# Patient Record
Sex: FEMALE | Race: WHITE | NOT HISPANIC OR LATINO | URBAN - METROPOLITAN AREA
[De-identification: names, ages, dates, MRNs, and addresses within clinical notes are randomized per-mention and may not be internally consistent; named-entity substitution may affect disease eponyms.]

---

## 2019-05-15 ENCOUNTER — EMERGENCY (EMERGENCY)
Facility: HOSPITAL | Age: 84
LOS: 0 days | Discharge: HOME | End: 2019-05-15
Attending: EMERGENCY MEDICINE | Admitting: EMERGENCY MEDICINE
Payer: MEDICARE

## 2019-05-15 VITALS
RESPIRATION RATE: 19 BRPM | OXYGEN SATURATION: 98 % | SYSTOLIC BLOOD PRESSURE: 120 MMHG | TEMPERATURE: 98 F | DIASTOLIC BLOOD PRESSURE: 69 MMHG | HEART RATE: 88 BPM

## 2019-05-15 VITALS
RESPIRATION RATE: 20 BRPM | TEMPERATURE: 98 F | DIASTOLIC BLOOD PRESSURE: 62 MMHG | OXYGEN SATURATION: 90 % | SYSTOLIC BLOOD PRESSURE: 127 MMHG | HEART RATE: 91 BPM

## 2019-05-15 DIAGNOSIS — E03.9 HYPOTHYROIDISM, UNSPECIFIED: ICD-10-CM

## 2019-05-15 DIAGNOSIS — Z79.899 OTHER LONG TERM (CURRENT) DRUG THERAPY: ICD-10-CM

## 2019-05-15 DIAGNOSIS — R06.02 SHORTNESS OF BREATH: ICD-10-CM

## 2019-05-15 DIAGNOSIS — I10 ESSENTIAL (PRIMARY) HYPERTENSION: ICD-10-CM

## 2019-05-15 DIAGNOSIS — Z87.891 PERSONAL HISTORY OF NICOTINE DEPENDENCE: ICD-10-CM

## 2019-05-15 DIAGNOSIS — Z79.51 LONG TERM (CURRENT) USE OF INHALED STEROIDS: ICD-10-CM

## 2019-05-15 DIAGNOSIS — J44.1 CHRONIC OBSTRUCTIVE PULMONARY DISEASE WITH (ACUTE) EXACERBATION: ICD-10-CM

## 2019-05-15 LAB
ALBUMIN SERPL ELPH-MCNC: 3.8 G/DL — SIGNIFICANT CHANGE UP (ref 3.5–5.2)
ALP SERPL-CCNC: 80 U/L — SIGNIFICANT CHANGE UP (ref 30–115)
ALT FLD-CCNC: 7 U/L — SIGNIFICANT CHANGE UP (ref 0–41)
ANION GAP SERPL CALC-SCNC: 11 MMOL/L — SIGNIFICANT CHANGE UP (ref 7–14)
AST SERPL-CCNC: 15 U/L — SIGNIFICANT CHANGE UP (ref 0–41)
BASE EXCESS BLDV CALC-SCNC: 4.8 MMOL/L — HIGH (ref -2–2)
BASOPHILS # BLD AUTO: 0 K/UL — SIGNIFICANT CHANGE UP (ref 0–0.2)
BASOPHILS NFR BLD AUTO: 0 % — SIGNIFICANT CHANGE UP (ref 0–1)
BILIRUB SERPL-MCNC: 0.3 MG/DL — SIGNIFICANT CHANGE UP (ref 0.2–1.2)
BUN SERPL-MCNC: 8 MG/DL — LOW (ref 10–20)
CA-I SERPL-SCNC: 1.21 MMOL/L — SIGNIFICANT CHANGE UP (ref 1.12–1.3)
CALCIUM SERPL-MCNC: 9.4 MG/DL — SIGNIFICANT CHANGE UP (ref 8.5–10.1)
CHLORIDE SERPL-SCNC: 98 MMOL/L — SIGNIFICANT CHANGE UP (ref 98–110)
CO2 SERPL-SCNC: 28 MMOL/L — SIGNIFICANT CHANGE UP (ref 17–32)
CREAT SERPL-MCNC: 0.8 MG/DL — SIGNIFICANT CHANGE UP (ref 0.7–1.5)
EOSINOPHIL # BLD AUTO: 6.39 K/UL — HIGH (ref 0–0.7)
EOSINOPHIL NFR BLD AUTO: 50.4 % — HIGH (ref 0–8)
GAS PNL BLDV: 136 MMOL/L — SIGNIFICANT CHANGE UP (ref 136–145)
GAS PNL BLDV: SIGNIFICANT CHANGE UP
GIANT PLATELETS BLD QL SMEAR: PRESENT — SIGNIFICANT CHANGE UP
GLUCOSE SERPL-MCNC: 99 MG/DL — SIGNIFICANT CHANGE UP (ref 70–99)
HCO3 BLDV-SCNC: 33 MMOL/L — HIGH (ref 22–29)
HCT VFR BLD CALC: 36.2 % — LOW (ref 37–47)
HCT VFR BLDA CALC: 61 % — HIGH (ref 34–44)
HGB BLD CALC-MCNC: 19.9 G/DL — HIGH (ref 14–18)
HGB BLD-MCNC: 11.8 G/DL — LOW (ref 12–16)
LACTATE BLDV-MCNC: SIGNIFICANT CHANGE UP MMOL/L (ref 0.5–1.6)
LYMPHOCYTES # BLD AUTO: 0.89 K/UL — LOW (ref 1.2–3.4)
LYMPHOCYTES # BLD AUTO: 7 % — LOW (ref 20.5–51.1)
MAGNESIUM SERPL-MCNC: 2.1 MG/DL — SIGNIFICANT CHANGE UP (ref 1.8–2.4)
MANUAL SMEAR VERIFICATION: SIGNIFICANT CHANGE UP
MCHC RBC-ENTMCNC: 31 PG — SIGNIFICANT CHANGE UP (ref 27–31)
MCHC RBC-ENTMCNC: 32.6 G/DL — SIGNIFICANT CHANGE UP (ref 32–37)
MCV RBC AUTO: 95 FL — SIGNIFICANT CHANGE UP (ref 81–99)
MONOCYTES # BLD AUTO: 0.33 K/UL — SIGNIFICANT CHANGE UP (ref 0.1–0.6)
MONOCYTES NFR BLD AUTO: 2.6 % — SIGNIFICANT CHANGE UP (ref 1.7–9.3)
NEUTROPHILS # BLD AUTO: 4.51 K/UL — SIGNIFICANT CHANGE UP (ref 1.4–6.5)
NEUTROPHILS NFR BLD AUTO: 35.6 % — LOW (ref 42.2–75.2)
PCO2 BLDV: 61 MMHG — HIGH (ref 41–51)
PH BLDV: 7.35 — SIGNIFICANT CHANGE UP (ref 7.26–7.43)
PLAT MORPH BLD: NORMAL — SIGNIFICANT CHANGE UP
PLATELET # BLD AUTO: 270 K/UL — SIGNIFICANT CHANGE UP (ref 130–400)
PO2 BLDV: 28 MMHG — SIGNIFICANT CHANGE UP (ref 20–40)
POTASSIUM BLDV-SCNC: SIGNIFICANT CHANGE UP MMOL/L (ref 3.3–5.6)
POTASSIUM SERPL-MCNC: 4.4 MMOL/L — SIGNIFICANT CHANGE UP (ref 3.5–5)
POTASSIUM SERPL-SCNC: 4.4 MMOL/L — SIGNIFICANT CHANGE UP (ref 3.5–5)
PROMYELOCYTES # FLD: 0.9 % — HIGH (ref 0–0)
PROT SERPL-MCNC: 7 G/DL — SIGNIFICANT CHANGE UP (ref 6–8)
RBC # BLD: 3.81 M/UL — LOW (ref 4.2–5.4)
RBC # FLD: 12.6 % — SIGNIFICANT CHANGE UP (ref 11.5–14.5)
RBC BLD AUTO: NORMAL — SIGNIFICANT CHANGE UP
SAO2 % BLDV: 50 % — SIGNIFICANT CHANGE UP
SODIUM SERPL-SCNC: 137 MMOL/L — SIGNIFICANT CHANGE UP (ref 135–146)
TROPONIN T SERPL-MCNC: <0.01 NG/ML — SIGNIFICANT CHANGE UP
VARIANT LYMPHS # BLD: 3.5 % — SIGNIFICANT CHANGE UP (ref 0–5)
WBC # BLD: 12.67 K/UL — HIGH (ref 4.8–10.8)
WBC # FLD AUTO: 12.67 K/UL — HIGH (ref 4.8–10.8)

## 2019-05-15 PROCEDURE — 71046 X-RAY EXAM CHEST 2 VIEWS: CPT | Mod: 26

## 2019-05-15 PROCEDURE — 93010 ELECTROCARDIOGRAM REPORT: CPT

## 2019-05-15 PROCEDURE — 99285 EMERGENCY DEPT VISIT HI MDM: CPT

## 2019-05-15 RX ORDER — IPRATROPIUM/ALBUTEROL SULFATE 18-103MCG
3 AEROSOL WITH ADAPTER (GRAM) INHALATION ONCE
Refills: 0 | Status: COMPLETED | OUTPATIENT
Start: 2019-05-15 | End: 2019-05-15

## 2019-05-15 RX ORDER — ALBUTEROL 90 UG/1
2.5 AEROSOL, METERED ORAL ONCE
Refills: 0 | Status: DISCONTINUED | OUTPATIENT
Start: 2019-05-15 | End: 2019-05-15

## 2019-05-15 RX ADMIN — Medication 3 MILLILITER(S): at 13:37

## 2019-05-15 RX ADMIN — Medication 125 MILLIGRAM(S): at 13:36

## 2019-05-15 RX ADMIN — Medication 3 MILLILITER(S): at 15:40

## 2019-05-15 NOTE — ED ADULT NURSE NOTE - OBJECTIVE STATEMENT
pt presents to ED c/o SOB x5 days, worsening upon waking up this morning. Pt has hx of COPD, no home 02. Pt denies any CP, c/o slight cough described as "tickle." Denies fevers or chills.

## 2019-05-15 NOTE — ED PROVIDER NOTE - PROGRESS NOTE DETAILS
upon ambulating pt off NC, pt became hypoxic (to 86%) and tachycardic. will admit for copd exacerbation. pt and family aware and agree with plan upon ambulating pt off NC, pt became hypoxic (to 86%) and tachycardic. will give more neb treatments and reassess. pt ambulated without any supplemental oxygen. pt and family comfortable with plan to d/c home with PMD follow up tomorrow. The patient improved with nebs, wants to go home, wheezing has resolved completely,  CXr without acute pathology.  Family is at bedside including a daughter who is a nurse and with whom the patient resides.  Family wants to take patient home, she has an appointment with her doctor tomorrow.  Patient to be discharged from ED. Any available test results were discussed with patient and family, copies were given to her. Verbal instructions given, including instructions to return to ED immediately for any new, worsening, or concerning symptoms. Patient endorsed understanding. Written discharge instructions additionally given, including follow-up plan.  Patient /family were given opportunity to ask questions. pt ambulated without any supplemental oxygen-pt no longer wheezing on exam. pt and family comfortable with plan to d/c home with PMD follow up tomorrow.

## 2019-05-15 NOTE — ED PROVIDER NOTE - OBJECTIVE STATEMENT
86 y/o F, h/o HTN, hypothyroidism, COPD (no home O2, no admissions, former smoker), presents with SOB and cough with clear sputum worsneing for the past 4 days. notes relief with neb treatement this morning. states symptoms exacerbated with walking outside today. last had a cardiac work up by Dr. Moore 5 months ago (including stress and ECHO) and states it was normal. denies fever, cp, rash, abd pain, post tussive emesis 88 y/o F, h/o HTN, hypothyroidism, COPD (no home O2, no admissions, former smoker), presents with SOB and cough with clear sputum worsening for the past 4 days. notes relief with neb treatement this morning. states symptoms exacerbated with walking outside today. last had a cardiac work up by Dr. Moore 5 months ago (including stress and ECHO) and states it was normal. denies fever, cp, rash, abd pain, post tussive emesis

## 2019-05-15 NOTE — ED PROVIDER NOTE - CLINICAL SUMMARY MEDICAL DECISION MAKING FREE TEXT BOX
86 yo female with COPD exacerbation, much improved with nebs, CXR  and ECG WNL, nml respiration on discharge.

## 2019-05-15 NOTE — ED PROVIDER NOTE - PHYSICAL EXAMINATION
VITAL SIGNS: I have reviewed nursing notes and confirm.  CONSTITUTIONAL: Well-developed; well-nourished; in no acute distress.  SKIN: Skin exam is warm and dry, <2 sec cap refill  HEAD: Normocephalic; atraumatic.  EYES: PERRL, EOM intact; normal conjunctiva.  ENT: MMM; airway clear.   NECK: Supple; non tender.  CARD: RRR, 2+ dp pulses  RESP: mild expiratory wheezes, speaking in full sentences. on 2L NC  ABD: soft non tender.   EXT: Normal ROM. No edema.  NEURO: Alert, oriented. Grossly unremarkable. No focal deficits.  PSYCH: Cooperative, appropriate.

## 2019-05-15 NOTE — ED PROVIDER NOTE - ATTENDING CONTRIBUTION TO CARE
88 yo female PMH as noted c/o non-productive cough and SOB for about 4 days,  No fever, chills, hemoptysis, leg pain or swelling, no dizziness or lightheadedness; she did not use nebs at home.  No recent illness, travel, trauma or immobilization.  Well-appearing elderly female, NAD,  exam shows severe kyphosis, expiratory wheezing mostly on the right, no accessory muscle use, speaking full sentences, RRR, abdomen soft, NT/ND, no leg edema or calf ttp, capillary refill < 2 sec.  Likely COPD exacerbation. will give nebs, steroids, check labs, CXR, ECG and reassess.  Patient wants to go home, but agrees with the plan as does her daughter.

## 2019-05-15 NOTE — ED PROVIDER NOTE - NS ED ROS FT
Review of Systems:  CONSTITUTIONAL: no fever  EYES: no photophobia, no blurred vision  ENT: no ear pain, no nasal discharge  RESPIRATORY: +shortness of breath, +cough  CARDIAC: no chest pain, no palpitations  GI: no abd pain, no nausea, no vomiting, no diarrhea, no constipation,   : no dysuria; no hematuria,   MUSCULOSKELETAL: no joint paint  NEUROLOGIC: no headache,   PSYCH: no anxiety, non suicidal, non homicidal, no hallucination, no depression

## 2021-09-08 ENCOUNTER — INPATIENT (INPATIENT)
Facility: HOSPITAL | Age: 86
LOS: 1 days | Discharge: HOME HEALTH | End: 2021-09-10
Attending: SURGERY | Admitting: SURGERY
Payer: MEDICARE

## 2021-09-08 VITALS
WEIGHT: 89.95 LBS | RESPIRATION RATE: 20 BRPM | SYSTOLIC BLOOD PRESSURE: 140 MMHG | DIASTOLIC BLOOD PRESSURE: 64 MMHG | HEIGHT: 62 IN | OXYGEN SATURATION: 99 % | TEMPERATURE: 97 F | HEART RATE: 76 BPM

## 2021-09-08 DIAGNOSIS — S80.212A ABRASION, LEFT KNEE, INITIAL ENCOUNTER: ICD-10-CM

## 2021-09-08 DIAGNOSIS — S06.6X0A TRAUMATIC SUBARACHNOID HEMORRHAGE WITHOUT LOSS OF CONSCIOUSNESS, INITIAL ENCOUNTER: ICD-10-CM

## 2021-09-08 DIAGNOSIS — I10 ESSENTIAL (PRIMARY) HYPERTENSION: ICD-10-CM

## 2021-09-08 DIAGNOSIS — S50.311A ABRASION OF RIGHT ELBOW, INITIAL ENCOUNTER: ICD-10-CM

## 2021-09-08 DIAGNOSIS — Z88.0 ALLERGY STATUS TO PENICILLIN: ICD-10-CM

## 2021-09-08 DIAGNOSIS — E03.9 HYPOTHYROIDISM, UNSPECIFIED: ICD-10-CM
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
## 2021-09-08 DIAGNOSIS — F03.90 UNSPECIFIED DEMENTIA WITHOUT BEHAVIORAL DISTURBANCE: ICD-10-CM

## 2021-09-08 DIAGNOSIS — S01.01XA LACERATION WITHOUT FOREIGN BODY OF SCALP, INITIAL ENCOUNTER: ICD-10-CM

## 2021-09-08 DIAGNOSIS — Z79.52 LONG TERM (CURRENT) USE OF SYSTEMIC STEROIDS: ICD-10-CM

## 2021-09-08 DIAGNOSIS — J44.9 CHRONIC OBSTRUCTIVE PULMONARY DISEASE, UNSPECIFIED: ICD-10-CM
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
## 2021-09-08 LAB
ALBUMIN SERPL ELPH-MCNC: 3.9 G/DL — SIGNIFICANT CHANGE UP (ref 3.5–5.2)
ALP SERPL-CCNC: 70 U/L — SIGNIFICANT CHANGE UP (ref 30–115)
ALT FLD-CCNC: 10 U/L — SIGNIFICANT CHANGE UP (ref 0–41)
ANION GAP SERPL CALC-SCNC: 12 MMOL/L — SIGNIFICANT CHANGE UP (ref 7–14)
APTT BLD: 23.6 SEC — CRITICAL LOW (ref 27–39.2)
AST SERPL-CCNC: 18 U/L — SIGNIFICANT CHANGE UP (ref 0–41)
BASOPHILS # BLD AUTO: 0.06 K/UL — SIGNIFICANT CHANGE UP (ref 0–0.2)
BASOPHILS NFR BLD AUTO: 0.8 % — SIGNIFICANT CHANGE UP (ref 0–1)
BILIRUB SERPL-MCNC: 0.2 MG/DL — SIGNIFICANT CHANGE UP (ref 0.2–1.2)
BLD GP AB SCN SERPL QL: SIGNIFICANT CHANGE UP
BUN SERPL-MCNC: 9 MG/DL — LOW (ref 10–20)
CALCIUM SERPL-MCNC: 9.1 MG/DL — SIGNIFICANT CHANGE UP (ref 8.5–10.1)
CHLORIDE SERPL-SCNC: 97 MMOL/L — LOW (ref 98–110)
CO2 SERPL-SCNC: 27 MMOL/L — SIGNIFICANT CHANGE UP (ref 17–32)
CREAT SERPL-MCNC: 0.7 MG/DL — SIGNIFICANT CHANGE UP (ref 0.7–1.5)
EOSINOPHIL # BLD AUTO: 0.98 K/UL — HIGH (ref 0–0.7)
EOSINOPHIL NFR BLD AUTO: 12.7 % — HIGH (ref 0–8)
ETHANOL SERPL-MCNC: <10 MG/DL — SIGNIFICANT CHANGE UP
GLUCOSE SERPL-MCNC: 129 MG/DL — HIGH (ref 70–99)
HCT VFR BLD CALC: 34.5 % — LOW (ref 37–47)
HGB BLD-MCNC: 11.3 G/DL — LOW (ref 12–16)
IMM GRANULOCYTES NFR BLD AUTO: 0.7 % — HIGH (ref 0.1–0.3)
INR BLD: 1.02 RATIO — SIGNIFICANT CHANGE UP (ref 0.65–1.3)
LACTATE SERPL-SCNC: 2 MMOL/L — SIGNIFICANT CHANGE UP (ref 0.7–2)
LIDOCAIN IGE QN: 50 U/L — SIGNIFICANT CHANGE UP (ref 7–60)
LYMPHOCYTES # BLD AUTO: 1.99 K/UL — SIGNIFICANT CHANGE UP (ref 1.2–3.4)
LYMPHOCYTES # BLD AUTO: 25.9 % — SIGNIFICANT CHANGE UP (ref 20.5–51.1)
MCHC RBC-ENTMCNC: 30.1 PG — SIGNIFICANT CHANGE UP (ref 27–31)
MCHC RBC-ENTMCNC: 32.8 G/DL — SIGNIFICANT CHANGE UP (ref 32–37)
MCV RBC AUTO: 92 FL — SIGNIFICANT CHANGE UP (ref 81–99)
MONOCYTES # BLD AUTO: 0.94 K/UL — HIGH (ref 0.1–0.6)
MONOCYTES NFR BLD AUTO: 12.2 % — HIGH (ref 1.7–9.3)
NEUTROPHILS # BLD AUTO: 3.67 K/UL — SIGNIFICANT CHANGE UP (ref 1.4–6.5)
NEUTROPHILS NFR BLD AUTO: 47.7 % — SIGNIFICANT CHANGE UP (ref 42.2–75.2)
NRBC # BLD: 0 /100 WBCS — SIGNIFICANT CHANGE UP (ref 0–0)
PLATELET # BLD AUTO: 231 K/UL — SIGNIFICANT CHANGE UP (ref 130–400)
POTASSIUM SERPL-MCNC: 4 MMOL/L — SIGNIFICANT CHANGE UP (ref 3.5–5)
POTASSIUM SERPL-SCNC: 4 MMOL/L — SIGNIFICANT CHANGE UP (ref 3.5–5)
PROT SERPL-MCNC: 6.3 G/DL — SIGNIFICANT CHANGE UP (ref 6–8)
PROTHROM AB SERPL-ACNC: 11.7 SEC — SIGNIFICANT CHANGE UP (ref 9.95–12.87)
RBC # BLD: 3.75 M/UL — LOW (ref 4.2–5.4)
RBC # FLD: 13.5 % — SIGNIFICANT CHANGE UP (ref 11.5–14.5)
SARS-COV-2 RNA SPEC QL NAA+PROBE: SIGNIFICANT CHANGE UP
SODIUM SERPL-SCNC: 136 MMOL/L — SIGNIFICANT CHANGE UP (ref 135–146)
WBC # BLD: 7.69 K/UL — SIGNIFICANT CHANGE UP (ref 4.8–10.8)
WBC # FLD AUTO: 7.69 K/UL — SIGNIFICANT CHANGE UP (ref 4.8–10.8)

## 2021-09-08 PROCEDURE — 72170 X-RAY EXAM OF PELVIS: CPT | Mod: 26

## 2021-09-08 PROCEDURE — 93308 TTE F-UP OR LMTD: CPT | Mod: 26

## 2021-09-08 PROCEDURE — 76604 US EXAM CHEST: CPT | Mod: 26

## 2021-09-08 PROCEDURE — 73070 X-RAY EXAM OF ELBOW: CPT | Mod: 26,RT

## 2021-09-08 PROCEDURE — 93010 ELECTROCARDIOGRAM REPORT: CPT

## 2021-09-08 PROCEDURE — 99291 CRITICAL CARE FIRST HOUR: CPT | Mod: 25

## 2021-09-08 PROCEDURE — 71045 X-RAY EXAM CHEST 1 VIEW: CPT | Mod: 26

## 2021-09-08 PROCEDURE — 70450 CT HEAD/BRAIN W/O DYE: CPT | Mod: 26,MA

## 2021-09-08 PROCEDURE — 71260 CT THORAX DX C+: CPT | Mod: 26,MA

## 2021-09-08 PROCEDURE — 76705 ECHO EXAM OF ABDOMEN: CPT | Mod: 26

## 2021-09-08 PROCEDURE — 12011 RPR F/E/E/N/L/M 2.5 CM/<: CPT

## 2021-09-08 PROCEDURE — 74177 CT ABD & PELVIS W/CONTRAST: CPT | Mod: 26,MA

## 2021-09-08 PROCEDURE — 70486 CT MAXILLOFACIAL W/O DYE: CPT | Mod: 26,MA

## 2021-09-08 PROCEDURE — 73030 X-RAY EXAM OF SHOULDER: CPT | Mod: 26,RT

## 2021-09-08 PROCEDURE — 72125 CT NECK SPINE W/O DYE: CPT | Mod: 26,MA

## 2021-09-08 PROCEDURE — 99223 1ST HOSP IP/OBS HIGH 75: CPT

## 2021-09-08 PROCEDURE — 73060 X-RAY EXAM OF HUMERUS: CPT | Mod: 26,RT

## 2021-09-08 PROCEDURE — 73090 X-RAY EXAM OF FOREARM: CPT | Mod: 26,RT

## 2021-09-08 PROCEDURE — 73562 X-RAY EXAM OF KNEE 3: CPT | Mod: 26,RT

## 2021-09-08 RX ORDER — PANTOPRAZOLE SODIUM 20 MG/1
40 TABLET, DELAYED RELEASE ORAL
Refills: 0 | Status: DISCONTINUED | OUTPATIENT
Start: 2021-09-08 | End: 2021-09-10

## 2021-09-08 RX ORDER — LEVETIRACETAM 250 MG/1
1000 TABLET, FILM COATED ORAL EVERY 12 HOURS
Refills: 0 | Status: DISCONTINUED | OUTPATIENT
Start: 2021-09-08 | End: 2021-09-09

## 2021-09-08 RX ORDER — ATENOLOL 25 MG/1
25 TABLET ORAL DAILY
Refills: 0 | Status: DISCONTINUED | OUTPATIENT
Start: 2021-09-08 | End: 2021-09-10

## 2021-09-08 RX ORDER — FLUTICASONE FUROATE AND VILANTEROL TRIFENATATE 100; 25 UG/1; UG/1
1 POWDER RESPIRATORY (INHALATION)
Qty: 0 | Refills: 0 | DISCHARGE

## 2021-09-08 RX ORDER — ALBUTEROL 90 UG/1
2 AEROSOL, METERED ORAL EVERY 6 HOURS
Refills: 0 | Status: DISCONTINUED | OUTPATIENT
Start: 2021-09-08 | End: 2021-09-10

## 2021-09-08 RX ORDER — BUDESONIDE AND FORMOTEROL FUMARATE DIHYDRATE 160; 4.5 UG/1; UG/1
2 AEROSOL RESPIRATORY (INHALATION) DAILY
Refills: 0 | Status: DISCONTINUED | OUTPATIENT
Start: 2021-09-08 | End: 2021-09-10

## 2021-09-08 RX ORDER — ACETAMINOPHEN 500 MG
650 TABLET ORAL EVERY 6 HOURS
Refills: 0 | Status: DISCONTINUED | OUTPATIENT
Start: 2021-09-08 | End: 2021-09-10

## 2021-09-08 RX ORDER — ATENOLOL 25 MG/1
1 TABLET ORAL
Qty: 0 | Refills: 0 | DISCHARGE

## 2021-09-08 RX ORDER — LEVETIRACETAM 250 MG/1
500 TABLET, FILM COATED ORAL EVERY 12 HOURS
Refills: 0 | Status: DISCONTINUED | OUTPATIENT
Start: 2021-09-08 | End: 2021-09-09

## 2021-09-08 RX ORDER — ALBUTEROL 90 UG/1
2 AEROSOL, METERED ORAL
Qty: 0 | Refills: 0 | DISCHARGE

## 2021-09-08 RX ORDER — SODIUM CHLORIDE 9 MG/ML
1000 INJECTION INTRAMUSCULAR; INTRAVENOUS; SUBCUTANEOUS
Refills: 0 | Status: DISCONTINUED | OUTPATIENT
Start: 2021-09-08 | End: 2021-09-09

## 2021-09-08 RX ORDER — LEVOTHYROXINE SODIUM 125 MCG
75 TABLET ORAL DAILY
Refills: 0 | Status: DISCONTINUED | OUTPATIENT
Start: 2021-09-08 | End: 2021-09-10

## 2021-09-08 RX ORDER — CHLORHEXIDINE GLUCONATE 213 G/1000ML
1 SOLUTION TOPICAL
Refills: 0 | Status: DISCONTINUED | OUTPATIENT
Start: 2021-09-08 | End: 2021-09-10

## 2021-09-08 RX ORDER — TETANUS TOXOID, REDUCED DIPHTHERIA TOXOID AND ACELLULAR PERTUSSIS VACCINE, ADSORBED 5; 2.5; 8; 8; 2.5 [IU]/.5ML; [IU]/.5ML; UG/.5ML; UG/.5ML; UG/.5ML
0.5 SUSPENSION INTRAMUSCULAR ONCE
Refills: 0 | Status: COMPLETED | OUTPATIENT
Start: 2021-09-08 | End: 2021-09-08

## 2021-09-08 RX ORDER — SENNA PLUS 8.6 MG/1
2 TABLET ORAL AT BEDTIME
Refills: 0 | Status: DISCONTINUED | OUTPATIENT
Start: 2021-09-08 | End: 2021-09-10

## 2021-09-08 RX ORDER — ALBUTEROL 90 UG/1
0 AEROSOL, METERED ORAL
Qty: 0 | Refills: 0 | DISCHARGE

## 2021-09-08 RX ORDER — LEVOTHYROXINE SODIUM 125 MCG
1 TABLET ORAL
Qty: 0 | Refills: 0 | DISCHARGE

## 2021-09-08 RX ORDER — ATENOLOL 25 MG/1
0 TABLET ORAL
Qty: 0 | Refills: 0 | DISCHARGE

## 2021-09-08 RX ORDER — SODIUM CHLORIDE 9 MG/ML
1000 INJECTION INTRAMUSCULAR; INTRAVENOUS; SUBCUTANEOUS
Refills: 0 | Status: DISCONTINUED | OUTPATIENT
Start: 2021-09-08 | End: 2021-09-08

## 2021-09-08 RX ORDER — ACETAMINOPHEN 500 MG
975 TABLET ORAL ONCE
Refills: 0 | Status: COMPLETED | OUTPATIENT
Start: 2021-09-08 | End: 2021-09-08

## 2021-09-08 RX ORDER — LEVOTHYROXINE SODIUM 125 MCG
0 TABLET ORAL
Qty: 0 | Refills: 0 | DISCHARGE

## 2021-09-08 RX ADMIN — LEVETIRACETAM 1000 MILLIGRAM(S): 250 TABLET, FILM COATED ORAL at 17:39

## 2021-09-08 RX ADMIN — SODIUM CHLORIDE 75 MILLILITER(S): 9 INJECTION INTRAMUSCULAR; INTRAVENOUS; SUBCUTANEOUS at 22:43

## 2021-09-08 RX ADMIN — Medication 650 MILLIGRAM(S): at 23:58

## 2021-09-08 RX ADMIN — Medication 975 MILLIGRAM(S): at 17:00

## 2021-09-08 RX ADMIN — SODIUM CHLORIDE 80 MILLILITER(S): 9 INJECTION INTRAMUSCULAR; INTRAVENOUS; SUBCUTANEOUS at 22:18

## 2021-09-08 RX ADMIN — LEVETIRACETAM 400 MILLIGRAM(S): 250 TABLET, FILM COATED ORAL at 16:15

## 2021-09-08 RX ADMIN — Medication 650 MILLIGRAM(S): at 23:55

## 2021-09-08 RX ADMIN — TETANUS TOXOID, REDUCED DIPHTHERIA TOXOID AND ACELLULAR PERTUSSIS VACCINE, ADSORBED 0.5 MILLILITER(S): 5; 2.5; 8; 8; 2.5 SUSPENSION INTRAMUSCULAR at 16:15

## 2021-09-08 NOTE — ED ADULT NURSE REASSESSMENT NOTE - NS ED NURSE REASSESS COMMENT FT1
Patient received from [facility] transfer, currently awake, alert and oriented x2, daughter at bedside, no acute distress at this time, will continue to watch and assess patient, safety and comfort measures maintained.

## 2021-09-08 NOTE — H&P ADULT - NSHPLABSRESULTS_GEN_ALL_CORE
Labs:               11.3   7.69  )-----------( 231      ( 08 Sep 2021 15:00 )             34.5       Auto Neutrophil %: 47.7 % (09-08-21 @ 15:00)  Auto Immature Granulocyte %: 0.7 % (09-08-21 @ 15:00)    09-08    136  |  97<L>  |  9<L>  ----------------------------<  129<H>  4.0   |  27  |  0.7    Calcium, Total Serum: 9.1 mg/dL (09-08-21 @ 15:00)    LFTs:             6.3  | 0.2  | 18       ------------------[70      ( 08 Sep 2021 15:00 )  3.9  | x    | 10          Lipase:50       Lactate, Blood: 2.0 mmol/L (09-08-21 @ 15:00)    Coags:     11.70  ----< 1.02    ( 08 Sep 2021 15:00 )     23.6     Alcohol, Blood: <10 mg/dL (09-08-21 @ 15:00)    RADIOLOGY:   < from: CT Head No Cont (09.08.21 @ 15:38) >  IMPRESSION:  1.  Small foci of subarachnoid hemorrhage in the right frontal sulci.  2.  Moderate/severe chronic microvascular changes.  3.  Right supraorbital region scalp soft tissue swelling.    < from: CT Cervical Spine No Cont (09.08.21 @ 15:47) >  IMPRESSION:  Diffuse osteopenia. No evidence of acute cervical spine fracture or subluxation. Mild degenerative changes.    < from: CT Maxillofacial No Cont (09.08.21 @ 15:47) >  IMPRESSION:  1.  Motion limited study. Right supraorbital region scalp soft tissue swelling. No evidence of acute facial fracture.  2.  Mucosal disease throughout the paranasal sinuses with air-fluid levels and reticular debris which can be seen in the setting of acute sinusitis.  3.  Severe degenerative changes of the temporomandibular joints.    < from: CT Chest w/ IV Cont (09.08.21 @ 15:53) >  IMPRESSION:  No evidence of intra-thoracic, abdominal or pelvic visceral laceration or hematoma.  No evidence of acute fracture.  There is a 2 x 1 cm pleural based ill-defined soft tissue mass in the left lower lobe laterally. Tissue sampling or PET-CT may be considered.  There is a 7.4 mm nodular density in the medial aspect of the left lower lobe..

## 2021-09-08 NOTE — CONSULT NOTE ADULT - ASSESSMENT
89F not on AC/AP s/p fall today with CTH showing R frontal small tSAH     PLAN   - No acute neurosurgical intervention   - 1g Keppra followed by 500mg BID x 7 days   - Q4 neurochecks   - Repeat CTH in 12 hours   - Trauma W/U  - Discussed case with Dr. Moore.  89F not on AC/AP s/p fall today with CTH showing R frontal small tSAH     PLAN   - No acute neurosurgical intervention   - 1g Keppra followed by 500mg BID x 7 days   - Q4 neurochecks   - Repeat CTH in 12 hours   - Trauma W/U  - Follow up with Dr. Moore outpatient in 2 weeks with outpatient CTH  - Discussed case with Dr. Moore.

## 2021-09-08 NOTE — ED PROVIDER NOTE - NSICDXPASTMEDICALHX_GEN_ALL_CORE_FT
PAST MEDICAL HISTORY:  COPD (chronic obstructive pulmonary disease)     HTN (hypertension)     Hypothyroid

## 2021-09-08 NOTE — CONSULT NOTE ADULT - TIME BILLING
Majority of encounter was utilized in examination the patient, counseling of the patient, and daughter, and formulation of the plan.

## 2021-09-08 NOTE — H&P ADULT - HISTORY OF PRESENT ILLNESS
89F w/PMHx of hypothyroidism, COPD, HTN, dementia presents s/p mechanical trip and fall day of presentation, +HT, -LOC, -AC. Per patient and her daughter, she was walking down the steps outside of her house when she fell forward and hit her head. Denies loss of consciousness. Patient presented to the [facility] site for evaluation, vitals WNL. A CT pan scan was performed at that time demonstrating a small foci of subarachnoid hemorrhage in the right frontal sulci. Patient was transferred to [place] for trauma and Neurosurgery evaluation. In the ED patient is GCS 15, A&Ox3, vitals WNL. Primary survey is negative. On exposure she has mild scalp swelling with ~2cm laceration above the right orbit s/p interrupted suture repair. She also has skin tears over the right elbow and left knee. Patient denies any pain at this time, denies scalp, spinal, chest, abdominal, or extremity tenderness (is mildly agitated).

## 2021-09-08 NOTE — ED PROVIDER NOTE - OBJECTIVE STATEMENT
89 yo female, pmh of dementia, htn, copd, hypothyroidism, presents to ed for fall. Witnessed fall, landed on right side, + lac, no loc. C/o right elbow and right knee pain.

## 2021-09-08 NOTE — CONSULT NOTE ADULT - ATTENDING COMMENTS
The patient was seen and examined at bedside.  Patient is elderly, status post mechanical fall.  Patient demonstrates small foci of hyperdensity within the right frontal lobe, consistent with traumatic subarachnoid hemorrhage.  Patient is not on any anticoagulation or antiplatelet agents.    Patient is awake, alert, and neurologically stable, according to the patient's daughter, who is a nurse.    Plan made, which was to obtain a follow-up CT scan head, and if stable, no intervention recommended.
s/p fall   SDH   admit to SDU   Neuro checks   repeat head CT   NSY following   PT consult   discharge planning

## 2021-09-08 NOTE — ED PROVIDER NOTE - ATTENDING CONTRIBUTION TO CARE
89 year old female with a pmh of hypothyroid copd htn & dementia presents here s/p fall down approximately 3 steps. + head trauma no loc ? not on ac. Daughter at bedside. Currently c/o pain in head knee right elbow pain. Tetanas status unknown.  On exam  CONSTITUTIONAL: WA / WN / NAD  HEAD: + right forehead hematoma & laceration  EYES: PERRL; EOMI;   ENT: Normal pharynx; mucous membranes pink/moist, no erythema.  NECK: Supple; no meningeal signs  CARD: RRR; nl S1/S2; no M/R/G.   RESP: Respiratory rate and effort are normal; breath sounds clear and equal bilaterally.  ABD: Soft, NT ND   MSK/EXT: No gross deformities; full range of motion. + right elbow skin tear + right knee abrasion. Midline TLS ttp.  SKIN: Warm and dry;   NEURO: At baseline  PSYCH:  Normal Affect     Patient came in as a trauma/fall I intend to do a bedside E-Fast 89 year old female with a pmh of hypothyroid copd htn & dementia presents here s/p fall down approximately 3 steps. + head trauma no loc ? not on ac. Daughter at bedside. Currently c/o pain in head knee right elbow pain. Tetanas status unknown.  On exam  CONSTITUTIONAL: WA / WN / NAD  HEAD: + right forehead hematoma & laceration  EYES: PERRL; EOMI;   ENT: Normal pharynx; mucous membranes pink/moist, no erythema.  NECK: Supple;  CARD: RRR; nl S1/S2; no M/R/G.   RESP: Respiratory rate and effort are normal; breath sounds clear and equal bilaterally.  ABD: Soft, NT ND   MSK/EXT: No gross deformities; full range of motion. + right shoulder abrasion + right elbow skin tear + right knee abrasion. Midline TLS ttp.  SKIN: Warm and dry;   NEURO: At baseline  PSYCH:  Normal Affect     Patient came in as a trauma/fall I intend to do a bedside E-Fast 89 year old female with a pmh of hypothyroid copd htn & dementia presents here s/p fall down approximately 3 steps. + head trauma no loc ? not on ac. Daughter at bedside. Currently c/o pain in head knee right elbow pain. Tetanas status unknown.  On exam  CONSTITUTIONAL: WA / WN / NAD  HEAD: + right forehead hematoma & 1.0 cmlaceration  EYES: PERRL; EOMI;   ENT: Normal pharynx; mucous membranes pink/moist, no erythema.  NECK: Supple;  CARD: RRR; nl S1/S2; no M/R/G.   RESP: Respiratory rate and effort are normal; breath sounds clear and equal bilaterally.  ABD: Soft, NT ND   MSK/EXT: No gross deformities; full range of motion. + right shoulder abrasion + right elbow skin tear + right knee abrasion. Midline TLS ttp.  SKIN: Warm and dry;   NEURO: At baseline  PSYCH:  Normal Affect     Patient came in as a trauma/fall I intend to do a bedside E-Fast 89 year old female with a pmh of hypothyroid copd htn & dementia presents here s/p fall down approximately 3 steps. + head trauma no loc not on ac. Daughter at bedside. Currently c/o pain in head knee right elbow pain. Tetanas status unknown.  On exam  CONSTITUTIONAL: WA / WN / NAD  HEAD: + right forehead hematoma & 1.0 cmlaceration  EYES: PERRL; EOMI;   ENT: Normal pharynx; mucous membranes pink/moist, no erythema.  NECK: Supple;  CARD: RRR; nl S1/S2; no M/R/G.   RESP: Respiratory rate and effort are normal; breath sounds clear and equal bilaterally.  ABD: Soft, NT ND   MSK/EXT: No gross deformities; full range of motion. + right shoulder abrasion + right elbow skin tear + right knee abrasion. Midline TLS ttp.  SKIN: Warm and dry;   NEURO: At baseline  PSYCH:  Normal Affect     Patient came in as a trauma/fall I intend to do a bedside E-Fast

## 2021-09-08 NOTE — ED ADULT TRIAGE NOTE - CHIEF COMPLAINT QUOTE
BIB from home for a tripped and fall, sustained a laceration on the right forehead, bruised on the right shoulder and right knee. No LOC.

## 2021-09-08 NOTE — H&P ADULT - ASSESSMENT
89F w/PMHx of hypothyroidism, COPD, HTN, dementia presents s/p mechanical trip and fall day of presentation, +HT, -LOC, -AC, with the following traumatic injuries:     1. Small foci of subarachnoid hemorrhage in the right frontal sulci    Plan:   -Admission to Trauma Surgery Service   -SDU consult for SAH as well as advanced age with multiple comorbidities   -Per Neurosurgery, Q4 hour neuro checks   -1g Keppra load in ED followed by 500mg BID for 7 days  -Repeat CTH in 12 hours after first (~4AM on 9/9)   -Continue home medications   -Adequate pain control  -GI ppx   -PT/Rehab/SW   -Patient and plan discussed with Dr. Iqbal

## 2021-09-08 NOTE — ED PROVIDER NOTE - UNABLE TO OBTAIN
Dementia I am unable to obtain a comprehensive history, review of systems, past medical history, and/or physical exam due to constraints imposed by the urgency of the patient's clinical condition and/or mental status.

## 2021-09-08 NOTE — H&P ADULT - ATTENDING COMMENTS
s/p fall   head trauma with small SDH   admit to SDU neuro checks  repeat head CT   Seizure prophylaxis

## 2021-09-08 NOTE — ED PROVIDER NOTE - IV ALTEPLASE DOOR HIDDEN
Assumed care of patient and bedside report received from previous RN. Patient educated on importance of calling, bed controls on, bed locked and in lowest position, call light with patient. Appropriate fall precautions in place. Belongs and bedside table in reach. No needs at this time.   show

## 2021-09-08 NOTE — ED PROVIDER NOTE - PROGRESS NOTE ADDITIONAL1
Additional Progress Note... How Severe Is Your Rash?: mild Is This A New Presentation, Or A Follow-Up?: Rash Additional History: Patient had a rash on his hands about 10 years ago which cleared with a prescription cream. Patient has not had any other rashes.

## 2021-09-08 NOTE — ED PROVIDER NOTE - CLINICAL SUMMARY MEDICAL DECISION MAKING FREE TEXT BOX
89F not on AC/AP s/p fall today with CTH showing R frontal small tSAH transferred from [facility]     Evaluated by neurosurgery, no acute surgical intervention  Trauma team evaluation completed at [facility]  Admitted to surgical service approved by Dr. Iqbal    Daughter showed me a video of patient who got up from chair on porch, she was stepping down from steps in front of home and fell forward as she had a missed a step causing her to fall forward. does not use walker or cane at baseline, otherwise functional status is good. able to do all ADLs.

## 2021-09-08 NOTE — CONSULT NOTE ADULT - ASSESSMENT
Assessment & Plan  89 y/o F w/PMHx of dementia, hypothyroidism, COPD (no home O2), HTN, who presents as a transfer from [facility], s/p mechanical trip and fall with small foci of SAH in the right frontal sulci.     NEURO:  #small foci of SAH in the right frontal sulci  -neuro checks q4h   -per NSG rept CT hd in 12hrs - @ 9pm tonight  -Keppra for seizure prophylaxis  - Acute pain-controlled with Tylenol prn    RESP:   #COPD (on Breo Ellipta @ home)  -Symbicort and Albuterol prn  -satting well on RA  -Encourage IS  -f/up CXR in am  -Activity- OOB        CARDS:   #HTN  -continue home Atenolol  -keep normotensive  -f/up ECG    GI/NUTR:   -keep NPO for now  -GI Prophylaxis- PPI  - Bowel regimen- Senna    /RENAL:        Monitor UO- no brown    -IVF: NS @ 75/hr    Labs:          BUN/Cr- 9/0.7  -->          Electrolytes-Na 136 // K 4.0 // Mg -- //  Phos -- (09-08 @ 15:00)  -Replete elctrolytes prn    HEME/ONC:       DVT prophylaxis- SCDs, hold Hep sq for now due to SAH    Labs: Hb/Hct:  11.3/34.5  -->                      Plts:  231  -->                 PTT/INR:  23.6/1.02  --->     ID:  WBC- 7.69  --->>  Temp trend- 24hrs T(F): 97.4 (09-08 @ 14:17), Max: 97.4 (09-08 @ 14:17)  -Covid neg    ENDO:  #hypothryoid  -continue home Synthroid  -   Glucose, Serum: 129 (09-08 @ 15:00)  -  HA1C in am    LINES/DRAINS:  PIV    DISPO:    SDU  -will d/w Dr. Iqbal Assessment & Plan  89 y/o F w/PMHx of dementia, hypothyroidism, COPD (no home O2), HTN, who presents as a transfer from [facility], s/p mechanical trip and fall with small foci of SAH in the right frontal sulci.     NEURO:  #small foci of SAH in the right frontal sulci  -neuro checks q4h   -per NSG rept CT hd in 12hrs - @ 4am on 9/9  -Keppra for seizure prophylaxis  - Acute pain-controlled with Tylenol prn    RESP:   #COPD (on Breo Ellipta @ home)  -Symbicort and Albuterol prn  -satting well on RA  -Encourage IS  -f/up CXR in am  -Activity- OOB        CARDS:   #HTN  -continue home Atenolol  -keep normotensive  -f/up ECG    GI/NUTR:   -keep NPO for now  -GI Prophylaxis- PPI  - Bowel regimen- Senna    /RENAL:        Monitor UO- no brown    -IVF: NS @ 75/hr    Labs:          BUN/Cr- 9/0.7  -->          Electrolytes-Na 136 // K 4.0 // Mg -- //  Phos -- (09-08 @ 15:00)  -Replete elctrolytes prn    HEME/ONC:       DVT prophylaxis- SCDs, hold Hep sq for now due to SAH    Labs: Hb/Hct:  11.3/34.5  -->                      Plts:  231  -->                 PTT/INR:  23.6/1.02  --->     ID:  WBC- 7.69  --->>  Temp trend- 24hrs T(F): 97.4 (09-08 @ 14:17), Max: 97.4 (09-08 @ 14:17)  -Covid neg    ENDO:  #hypothryoid  -continue home Synthroid  -   Glucose, Serum: 129 (09-08 @ 15:00)  -  HA1C in am    LINES/DRAINS:  PIV    DISPO:    SDU  -will d/w Dr. Iqbal Assessment & Plan  88 y/o F w/PMHx of dementia, hypothyroidism, COPD (no home O2), HTN, who presents as a transfer from [facility], s/p mechanical trip and fall with small foci of SAH in the right frontal sulci.     NEURO:  #small foci of SAH in the right frontal sulci  -neuro checks q4h   -per NSG rept CT hd in am on 9/9  -Keppra for seizure prophylaxis  - Acute pain-controlled with Tylenol prn    RESP:   #COPD (on Breo Ellipta @ home)  -Symbicort and Albuterol prn  -satting well on RA  -Encourage IS  -f/up CXR in am  -Activity- OOB        CARDS:   #HTN  -continue home Atenolol  -keep normotensive  -f/up ECG    GI/NUTR:   -keep NPO for now  -GI Prophylaxis- PPI  - Bowel regimen- Senna    /RENAL:        Monitor UO- no brown    -IVF: NS @ 75/hr    Labs:          BUN/Cr- 9/0.7  -->          Electrolytes-Na 136 // K 4.0 // Mg -- //  Phos -- (09-08 @ 15:00)  -Replete elctrolytes prn    HEME/ONC:       DVT prophylaxis- SCDs, hold Hep sq for now due to SAH    Labs: Hb/Hct:  11.3/34.5  -->                      Plts:  231  -->                 PTT/INR:  23.6/1.02  --->     ID:  WBC- 7.69  --->>  Temp trend- 24hrs T(F): 97.4 (09-08 @ 14:17), Max: 97.4 (09-08 @ 14:17)  -Covid neg    ENDO:  #hypothryoid  -continue home Synthroid  -   Glucose, Serum: 129 (09-08 @ 15:00)  -  HA1C in am    LINES/DRAINS:  PIV    DISPO:    SDU  -will d/w Dr. Iqbal

## 2021-09-08 NOTE — ED PROVIDER NOTE - CARE PLAN
1 Principal Discharge DX:	Subarachnoid hemorrhage  Secondary Diagnosis:	Laceration of head  Secondary Diagnosis:	Abrasion  Secondary Diagnosis:	Skin tear of elbow without complication, initial encounter

## 2021-09-08 NOTE — ED PROVIDER NOTE - PHYSICAL EXAMINATION
Physical Exam    Vital Signs: I have reviewed the initial vital signs.  Constitutional: well-nourished, appears stated age, no acute distress  Eyes: Conjunctiva pink, Sclera clear  Cardiovascular: S1 and S2, regular rate, regular rhythm, well-perfused extremities, radial pulses equal and 2+  Respiratory: unlabored respiratory effort, clear to auscultation bilaterally no wheezing, rales and rhonchi  Gastrointestinal: soft, non-tender abdomen, no pulsatile mass, normal bowl sounds  Musculoskeletal: supple neck, no lower extremity edema, no midline tenderness  Integumentary:1 cm lac to right eyebrow, abrasions to right elbow and right knee.   Neurologic: awake, alert, nvi

## 2021-09-08 NOTE — ED ADULT NURSE REASSESSMENT NOTE - NS ED NURSE REASSESS COMMENT FT1
pt to be transferred to [place] ED. Report given to Crit Lead RN Carmita, Carmita will endorse report to Charge RN.

## 2021-09-08 NOTE — H&P ADULT - NSHPPHYSICALEXAM_GEN_ALL_CORE
PHYSICAL EXAM:  GENERAL: NAD, well-appearing, GCS 15, A&Ox3  HEENT: ~2 cm laceration above the right orbit, s/p repair with interrupted suture without underlying hematoma. Ecchymoses of the right lower eyelid. no c-spine tenderness   CHEST/LUNG: Clear to auscultation bilaterally, no tenderness at the chest wall, no subcutaneous emphysema   HEART: Regular rate and rhythm  ABDOMEN: Soft, Nontender, Nondistended  EXTREMITIES:  No clubbing, cyanosis, or edema. Skin tears at the right arm and left knee

## 2021-09-08 NOTE — ED PROVIDER NOTE - PROGRESS NOTE DETAILS
SR: ok to scan without labs. SR: spoke with radiologist about right subarachnoid. Called trauma tx line spoke with Dr. Holt aware of transfer SR: spoke with radiologist about right subarachnoid. Called trauma tx line spoke with Dr. Holt aware of transfer. Spoke with Dr. Nolan in crit aware of transfer neurosx consult placed. FF: neurosurg and trauma made aware [place]. as per pt daughter pt has a PMH of tachycardia on atenolol, COPD, and hypothyroidism presents to the ED for evaluation of fall outside caught on camera and witnessed by grandson. pt was walking down the outside front steps and missed the last step and fell forward and onto her left side. pt recalls the fall, and denies loc. pt [place], and has right facial sutures. pt denies chest pain, sob, abdominal pain, n/v/d/c, neck pain, back pain, n/v/d/c, numbness, tingling, weakness, urinary or bowel retention or incontinence, use of blood thinners. pt denies any complaints at this time.     Physical Exam    Vital Signs: I have reviewed the initial vital signs.  Constitutional: well-nourished, appears stated age, no acute distress  Eyes: Conjunctiva pink, Sclera clear, PERRLA, EOMI without pain. negative hyphema. negative subconjunctival hemorrhage. visual fields intact. ecchymosis to the upper inner right eyelid toward nasal region.   Cardiovascular: S1 and S2, regular rate, regular rhythm, well-perfused extremities, radial pulses equal and 2+ b/l.   Respiratory: unlabored respiratory effort, clear to auscultation bilaterally no wheezing, rales and rhonchi. pt is speaking full sentences. no accessory muscle use.   Gastrointestinal: soft, non-tender, nondistended abdomen, no pulsatile mass, normal bowl sounds, no rebound, no guarding, no organomegaly.   Musculoskeletal: supple neck, no lower extremity edema, no calf tenderness, no midline tenderness, no palpable spinal step offs. FROM of b/l upper and lower extremities without pain.   Integumentary: warm, dry, no rash. (+) ecchymosis in b/l forearms and right side of the face  Neurologic: awake, alert, cranial nerves II-XII grossly intact, extremities’ motor and sensory functions grossly intact. finger to nose intact. negative pronator drift.   Psychiatric: appropriate mood, appropriate affect

## 2021-09-09 LAB
ANION GAP SERPL CALC-SCNC: 7 MMOL/L — SIGNIFICANT CHANGE UP (ref 7–14)
BUN SERPL-MCNC: 8 MG/DL — LOW (ref 10–20)
CALCIUM SERPL-MCNC: 8.8 MG/DL — SIGNIFICANT CHANGE UP (ref 8.5–10.1)
CHLORIDE SERPL-SCNC: 100 MMOL/L — SIGNIFICANT CHANGE UP (ref 98–110)
CO2 SERPL-SCNC: 28 MMOL/L — SIGNIFICANT CHANGE UP (ref 17–32)
CREAT SERPL-MCNC: 0.6 MG/DL — LOW (ref 0.7–1.5)
GLUCOSE BLDC GLUCOMTR-MCNC: 105 MG/DL — HIGH (ref 70–99)
GLUCOSE BLDC GLUCOMTR-MCNC: 95 MG/DL — SIGNIFICANT CHANGE UP (ref 70–99)
GLUCOSE SERPL-MCNC: 111 MG/DL — HIGH (ref 70–99)
HCT VFR BLD CALC: 37.4 % — SIGNIFICANT CHANGE UP (ref 37–47)
HGB BLD-MCNC: 11.9 G/DL — LOW (ref 12–16)
MAGNESIUM SERPL-MCNC: 2 MG/DL — SIGNIFICANT CHANGE UP (ref 1.8–2.4)
MCHC RBC-ENTMCNC: 29 PG — SIGNIFICANT CHANGE UP (ref 27–31)
MCHC RBC-ENTMCNC: 31.8 G/DL — LOW (ref 32–37)
MCV RBC AUTO: 91.2 FL — SIGNIFICANT CHANGE UP (ref 81–99)
NRBC # BLD: 0 /100 WBCS — SIGNIFICANT CHANGE UP (ref 0–0)
PHOSPHATE SERPL-MCNC: 3.8 MG/DL — SIGNIFICANT CHANGE UP (ref 2.1–4.9)
PLATELET # BLD AUTO: 201 K/UL — SIGNIFICANT CHANGE UP (ref 130–400)
POTASSIUM SERPL-MCNC: 4.3 MMOL/L — SIGNIFICANT CHANGE UP (ref 3.5–5)
POTASSIUM SERPL-SCNC: 4.3 MMOL/L — SIGNIFICANT CHANGE UP (ref 3.5–5)
RBC # BLD: 4.1 M/UL — LOW (ref 4.2–5.4)
RBC # FLD: 13.5 % — SIGNIFICANT CHANGE UP (ref 11.5–14.5)
SODIUM SERPL-SCNC: 135 MMOL/L — SIGNIFICANT CHANGE UP (ref 135–146)
TROPONIN T SERPL-MCNC: <0.01 NG/ML — SIGNIFICANT CHANGE UP
WBC # BLD: 7.24 K/UL — SIGNIFICANT CHANGE UP (ref 4.8–10.8)
WBC # FLD AUTO: 7.24 K/UL — SIGNIFICANT CHANGE UP (ref 4.8–10.8)

## 2021-09-09 PROCEDURE — 99291 CRITICAL CARE FIRST HOUR: CPT

## 2021-09-09 PROCEDURE — 70450 CT HEAD/BRAIN W/O DYE: CPT | Mod: 26

## 2021-09-09 PROCEDURE — 71045 X-RAY EXAM CHEST 1 VIEW: CPT | Mod: 26

## 2021-09-09 RX ORDER — LEVETIRACETAM 250 MG/1
500 TABLET, FILM COATED ORAL
Refills: 0 | Status: DISCONTINUED | OUTPATIENT
Start: 2021-09-09 | End: 2021-09-10

## 2021-09-09 RX ORDER — HEPARIN SODIUM 5000 [USP'U]/ML
5000 INJECTION INTRAVENOUS; SUBCUTANEOUS EVERY 8 HOURS
Refills: 0 | Status: DISCONTINUED | OUTPATIENT
Start: 2021-09-09 | End: 2021-09-10

## 2021-09-09 RX ADMIN — LEVETIRACETAM 500 MILLIGRAM(S): 250 TABLET, FILM COATED ORAL at 17:39

## 2021-09-09 RX ADMIN — Medication 650 MILLIGRAM(S): at 17:39

## 2021-09-09 RX ADMIN — LEVETIRACETAM 400 MILLIGRAM(S): 250 TABLET, FILM COATED ORAL at 06:26

## 2021-09-09 RX ADMIN — Medication 650 MILLIGRAM(S): at 06:28

## 2021-09-09 RX ADMIN — Medication 650 MILLIGRAM(S): at 06:58

## 2021-09-09 RX ADMIN — ATENOLOL 25 MILLIGRAM(S): 25 TABLET ORAL at 06:26

## 2021-09-09 RX ADMIN — HEPARIN SODIUM 5000 UNIT(S): 5000 INJECTION INTRAVENOUS; SUBCUTANEOUS at 13:01

## 2021-09-09 RX ADMIN — CHLORHEXIDINE GLUCONATE 1 APPLICATION(S): 213 SOLUTION TOPICAL at 06:28

## 2021-09-09 RX ADMIN — Medication 75 MICROGRAM(S): at 06:27

## 2021-09-09 RX ADMIN — SENNA PLUS 2 TABLET(S): 8.6 TABLET ORAL at 21:18

## 2021-09-09 RX ADMIN — HEPARIN SODIUM 5000 UNIT(S): 5000 INJECTION INTRAVENOUS; SUBCUTANEOUS at 21:18

## 2021-09-09 RX ADMIN — Medication 650 MILLIGRAM(S): at 13:31

## 2021-09-09 RX ADMIN — Medication 650 MILLIGRAM(S): at 13:00

## 2021-09-09 RX ADMIN — Medication 650 MILLIGRAM(S): at 19:41

## 2021-09-09 RX ADMIN — PANTOPRAZOLE SODIUM 40 MILLIGRAM(S): 20 TABLET, DELAYED RELEASE ORAL at 06:26

## 2021-09-09 RX ADMIN — Medication 650 MILLIGRAM(S): at 23:04

## 2021-09-09 RX ADMIN — Medication 650 MILLIGRAM(S): at 23:34

## 2021-09-09 NOTE — PROGRESS NOTE ADULT - TIME BILLING
Majority of encounter was utilized in examination of patient, discussion with the patient's daughter, review of imaging, and formation of plan.

## 2021-09-09 NOTE — PROGRESS NOTE ADULT - ASSESSMENT
Assessment and Recommendation:   · Assessment	  Assessment & Plan  89 y/o F w/PMHx of dementia, hypothyroidism, COPD (no home O2), HTN, who presents as a transfer from [facility], s/p mechanical trip and fall with small foci of SAH in the right frontal sulci.     NEURO:  #small foci of SAH in the right frontal sulci  -neuro checks q4h   -per NSG rept CT hd in 12hrs - @ 4am on 9/9  -Keppra for seizure prophylaxis  - Acute pain-controlled with Tylenol prn    RESP:   #COPD (on Breo Ellipta @ home)  -Symbicort and Albuterol prn  -satting well on RA  -Encourage IS  -f/up CXR in am  -Activity- OOB        CARDS:   #HTN  -continue home Atenolol  -keep normotensive  -ECG: NSR @ 73 bpm, QTc 414    GI/NUTR:   -keep NPO for now  -GI Prophylaxis- PPI  - Bowel regimen- Senna    /RENAL:        Monitor UO- no brown    -IVF: NS @ 75/hr    Labs:          BUN/Cr- 9/0.7  -->          Electrolytes-Na 136 // K 4.0 // Mg -- //  Phos -- (09-08 @ 15:00)  -Replete elctrolytes prn    HEME/ONC:       DVT prophylaxis- SCDs, hold Hep sq for now due to SAH    Labs: Hb/Hct:  11.3/34.5  -->                      Plts:  231  -->                 PTT/INR:  23.6/1.02  --->     ID:  WBC- 7.69  --->>  Temp trend- 24hrs T(F): 97.4 (09-08 @ 14:17), Max: 97.4 (09-08 @ 14:17)  -Covid neg    ENDO:  #hypothryoid  -continue home Synthroid  -   Glucose, Serum: 129 (09-08 @ 15:00)  -  HA1C in am    LINES/DRAINS:  PIV    DISPO:    SDU                 Assessment and Recommendation:   · Assessment	  Assessment & Plan  88 y/o F w/PMHx of dementia, hypothyroidism, COPD (no home O2), HTN, who presents as a transfer from [facility], s/p mechanical trip and fall with small foci of SAH in the right frontal sulci.     NEURO:  #small foci of SAH in the right frontal sulci  -neuro checks q4h   -per NSG rept CT hd in 12hrs - in am on 9/9  -Keppra for seizure prophylaxis  - Acute pain-controlled with Tylenol prn    RESP:   #COPD (on Breo Ellipta @ home)  -Symbicort and Albuterol prn  -satting well on RA  -Encourage IS  -f/up CXR in am  -Activity- OOB        CARDS:   #HTN  -continue home Atenolol  -keep normotensive  -ECG: NSR @ 73 bpm, QTc 414    GI/NUTR:   -keep NPO for now  -GI Prophylaxis- PPI  - Bowel regimen- Senna    /RENAL:        Monitor UO- no brown    -IVF: NS @ 75/hr    Labs:          BUN/Cr- 9/0.7  -->          Electrolytes-Na 136 // K 4.0 // Mg -- //  Phos -- (09-08 @ 15:00)  -Replete elctrolytes prn    HEME/ONC:       DVT prophylaxis- SCDs, hold Hep sq for now due to SAH    Labs: Hb/Hct:  11.3/34.5  -->                      Plts:  231  -->                 PTT/INR:  23.6/1.02  --->     ID:  WBC- 7.69  --->>  Temp trend- 24hrs T(F): 97.4 (09-08 @ 14:17), Max: 97.4 (09-08 @ 14:17)  -Covid neg    ENDO:  #hypothryoid  -continue home Synthroid  -   Glucose, Serum: 129 (09-08 @ 15:00)  -  HA1C in am    LINES/DRAINS:  PIV    DISPO:    SDU

## 2021-09-09 NOTE — CONSULT NOTE ADULT - ASSESSMENT
IMPRESSION: Rehab of SAH / s/p fall    PRECAUTIONS: [   ] Cardiac  [   ] Respiratory  [   ] Seizures [   ] Contact Isolation  [   ] Droplet Isolation  [   ] Other    Weight Bearing Status:     RECOMMENDATION:    Out of Bed to Chair     DVT/Decubiti Prophylaxis    REHAB PLAN:     [  x  ] Bedside P/T 3-5 times a week   [  x  ]   Bedside O/T  2-3 times a week             [    ] Speech Therapy               [    ]  No Rehab Therapy Indicated   Conditioning/ROM                                    ADL  Bed Mobility                                               Conditioning/ROM  Transfers                                                     Bed Mobility  Sitting /Standing Balance                         Transfers                                        Gait Training                                               Sitting/Standing Balance  Stair Training [   ]Applicable                    Home equipment Eval                                                                        Splinting  [   ] Only      GOALS:   ADL   [ x   ]   Independent                    Transfers  [    x] Independent                          Ambulation  [   x ] Independent     [     x] With device                            [    ]  CG                                                         [    ]  CG                                                                  [    ] CG                            [    ] Min A                                                   [    ] Min A                                                              [    ] Min  A          DISCHARGE PLAN:   [    ]  Good candidate for Intensive Rehabilitation/Hospital based                                             Will tolerate 3hrs Intensive Rehab Daily                                       [  x   ]  Short Term Rehab in Skilled Nursing Facility                         vs           [   x  ]  Home with Outpatient or VN services                                         [     ]  Possible Candidate for Intensive Hospital based Rehab

## 2021-09-09 NOTE — CHART NOTE - NSCHARTNOTEFT_GEN_A_CORE
SICU Transfer Note:    Transfer from: SICU  Transfer to:  ( x ) Surgery    (  ) Telemetry    (  ) Medicine    (  ) Palliative    (  ) Stroke Unit    (  ) _______________      SICU COURSE:  89 y/o F w/PMHx of dementia, hypothyroidism, COPD (no home O2), HTN, who presents as a transfer from [facility], s/p mechanical trip and fall day of presentation, +HT, -LOC, -AC.  Per patient and her daughter, she was walking down the steps outside of her house when she fell forward and hit her head.  CT pan scan was performed at that time demonstrating a small foci of SAH in the right frontal sulci.  Patient was transferred to [place] for trauma and Neurosurgery evaluation.   In the ED patient is GCS 15, A&Ox3, vitals WNL. Primary survey is negative. On exposure, she has mild scalp swelling with ~2cm laceration above the right orbit --> s/p interrupted suture repair.  SICU/SDU called for close hemodynamic monitoring and neuro checks q4h.     repeat CT done showing no significant changes, CTH stable, NSX okay with HSQ, diet and are signing off.        PAST MEDICAL & SURGICAL HISTORY:  HTN (hypertension)    Hypothyroid    COPD (chronic obstructive pulmonary disease)    No significant past surgical history      Allergies    penicillin (Short breath; Hives)    Intolerances      MEDICATIONS  (STANDING):  acetaminophen   Tablet .. 650 milliGRAM(s) Oral every 6 hours  ATENolol  Tablet 25 milliGRAM(s) Oral daily  budesonide  80 MICROgram(s)/formoterol 4.5 MICROgram(s) Inhaler 2 Puff(s) Inhalation daily  chlorhexidine 4% Liquid 1 Application(s) Topical <User Schedule>  heparin   Injectable 5000 Unit(s) SubCutaneous every 8 hours  levETIRAcetam 500 milliGRAM(s) Oral two times a day  levothyroxine 75 MICROGram(s) Oral daily  pantoprazole    Tablet 40 milliGRAM(s) Oral before breakfast  senna 2 Tablet(s) Oral at bedtime    MEDICATIONS  (PRN):  ALBUTerol    90 MICROgram(s) HFA Inhaler 2 Puff(s) Inhalation every 6 hours PRN Shortness of Breath and/or Wheezing        Vital Signs Last 24 Hrs  T(C): 36.1 (09 Sep 2021 08:44), Max: 36.4 (08 Sep 2021 22:17)  T(F): 97 (09 Sep 2021 08:44), Max: 97.5 (08 Sep 2021 22:17)  HR: 68 (09 Sep 2021 08:44) (68 - 82)  BP: 126/60 (09 Sep 2021 08:44) (125/56 - 167/72)  BP(mean): 86 (09 Sep 2021 08:44) (86 - 99)  RR: 18 (09 Sep 2021 08:44) (18 - 20)  SpO2: 96% (09 Sep 2021 08:44) (96% - 99%)  I&O's Summary    08 Sep 2021 07:01  -  09 Sep 2021 07:00  --------------------------------------------------------  IN: 300 mL / OUT: 0 mL / NET: 300 mL        LABS                                            11.3                  Neurophils% (auto):   47.7   (09-08 @ 15:00):    7.69 )-----------(231          Lymphocytes% (auto):  25.9                                          34.5                   Eosinphils% (auto):   12.7     Manual%: Neutrophils x    ; Lymphocytes x    ; Eosinophils x    ; Bands%: x    ; Blasts x                                    136    |  97     |  9                   Calcium: 9.1   / iCa: x      (09-08 @ 15:00)    ----------------------------<  129       Magnesium: x                                4.0     |  27     |  0.7              Phosphorous: x        TPro  6.3    /  Alb  3.9    /  TBili  0.2    /  DBili  x      /  AST  18     /  ALT  10     /  AlkPhos  70     08 Sep 2021 15:00    ( 09-08 @ 15:00 )   PT: 11.70 sec;   INR: 1.02 ratio  aPTT: 23.6 sec        ASSESSMENT/PLAN: 89yFemale      89 y/o F w/PMHx of dementia, hypothyroidism, COPD (no home O2), HTN, who presents as a transfer from [facility], s/p mechanical trip and fall with small foci of SAH in the right frontal sulci.     NEURO:  #small foci of SAH in the right frontal sulci  -neuro checks q4h   -per NSG rept CT hd in 12hrs - in am on 9/9  -Keppra for seizure prophylaxis  - Acute pain-controlled with Tylenol prn    RESP:   #COPD (on Breo Ellipta @ home)  -Symbicort and Albuterol prn  -satting well on RA  -Encourage IS  -f/up CXR in am  -Activity- OOB        CARDS:   #HTN  -continue home Atenolol  -keep normotensive  -ECG: NSR @ 73 bpm, QTc 414    GI/NUTR:   -keep NPO for now  -GI Prophylaxis- PPI  - Bowel regimen- Senna    /RENAL:        Monitor UO- no brown    -IVF: NS @ 75/hr    Labs:          BUN/Cr- 9/0.7  -->          Electrolytes-Na 136 // K 4.0 // Mg -- //  Phos -- (09-08 @ 15:00)  -Replete elctrolytes prn    HEME/ONC:       DVT prophylaxis- SCDs, hold Hep sq for now due to SAH    Labs: Hb/Hct:  11.3/34.5  -->                      Plts:  231  -->                 PTT/INR:  23.6/1.02  --->     ID:  WBC- 7.69  --->>  Temp trend- 24hrs T(F): 97.4 (09-08 @ 14:17), Max: 97.4 (09-08 @ 14:17)  -Covid neg    ENDO:  #hypothryoid  -continue home Synthroid  -   Glucose, Serum: 129 (09-08 @ 15:00)  -  HA1C in am SICU Transfer Note:    Transfer from: SICU  Transfer to:  ( x ) Surgery    (  ) Telemetry    (  ) Medicine    (  ) Palliative    (  ) Stroke Unit    (  ) _______________      SICU COURSE:  88 y/o F w/PMHx of dementia, hypothyroidism, COPD (no home O2), HTN, who presents as a transfer from [facility], s/p mechanical trip and fall day of presentation, +HT, -LOC, -AC.  Per patient and her daughter, she was walking down the steps outside of her house when she fell forward and hit her head.  CT pan scan was performed at that time demonstrating a small foci of SAH in the right frontal sulci.  Patient was transferred to [place] for trauma and Neurosurgery evaluation.   In the ED patient is GCS 15, A&Ox3, vitals WNL. Primary survey is negative. On exposure, she has mild scalp swelling with ~2cm laceration above the right orbit --> s/p interrupted suture repair.  SICU/SDU called for close hemodynamic monitoring and neuro checks q4h.     repeat CT done showing no significant changes, CTH stable, NSX okay with HSQ, diet and are signing off.        PAST MEDICAL & SURGICAL HISTORY:  HTN (hypertension)    Hypothyroid    COPD (chronic obstructive pulmonary disease)    No significant past surgical history      Allergies    penicillin (Short breath; Hives)    Intolerances      MEDICATIONS  (STANDING):  acetaminophen   Tablet .. 650 milliGRAM(s) Oral every 6 hours  ATENolol  Tablet 25 milliGRAM(s) Oral daily  budesonide  80 MICROgram(s)/formoterol 4.5 MICROgram(s) Inhaler 2 Puff(s) Inhalation daily  chlorhexidine 4% Liquid 1 Application(s) Topical <User Schedule>  heparin   Injectable 5000 Unit(s) SubCutaneous every 8 hours  levETIRAcetam 500 milliGRAM(s) Oral two times a day  levothyroxine 75 MICROGram(s) Oral daily  pantoprazole    Tablet 40 milliGRAM(s) Oral before breakfast  senna 2 Tablet(s) Oral at bedtime    MEDICATIONS  (PRN):  ALBUTerol    90 MICROgram(s) HFA Inhaler 2 Puff(s) Inhalation every 6 hours PRN Shortness of Breath and/or Wheezing        Vital Signs Last 24 Hrs  T(C): 36.1 (09 Sep 2021 08:44), Max: 36.4 (08 Sep 2021 22:17)  T(F): 97 (09 Sep 2021 08:44), Max: 97.5 (08 Sep 2021 22:17)  HR: 68 (09 Sep 2021 08:44) (68 - 82)  BP: 126/60 (09 Sep 2021 08:44) (125/56 - 167/72)  BP(mean): 86 (09 Sep 2021 08:44) (86 - 99)  RR: 18 (09 Sep 2021 08:44) (18 - 20)  SpO2: 96% (09 Sep 2021 08:44) (96% - 99%)  I&O's Summary    08 Sep 2021 07:01  -  09 Sep 2021 07:00  --------------------------------------------------------  IN: 300 mL / OUT: 0 mL / NET: 300 mL        LABS                                            11.3                  Neurophils% (auto):   47.7   (09-08 @ 15:00):    7.69 )-----------(231          Lymphocytes% (auto):  25.9                                          34.5                   Eosinphils% (auto):   12.7     Manual%: Neutrophils x    ; Lymphocytes x    ; Eosinophils x    ; Bands%: x    ; Blasts x                                    136    |  97     |  9                   Calcium: 9.1   / iCa: x      (09-08 @ 15:00)    ----------------------------<  129       Magnesium: x                                4.0     |  27     |  0.7              Phosphorous: x        TPro  6.3    /  Alb  3.9    /  TBili  0.2    /  DBili  x      /  AST  18     /  ALT  10     /  AlkPhos  70     08 Sep 2021 15:00    ( 09-08 @ 15:00 )   PT: 11.70 sec;   INR: 1.02 ratio  aPTT: 23.6 sec        ASSESSMENT/PLAN: 89yFemale      88 y/o F w/PMHx of dementia, hypothyroidism, COPD (no home O2), HTN, who presents as a transfer from [facility], s/p mechanical trip and fall with small foci of SAH in the right frontal sulci.     NEURO:  #small foci of SAH in the right frontal sulci  -neuro checks q4h   -per NSG rept CT hd in 12hrs - in am on 9/9  -Keppra for seizure prophylaxis  - Acute pain-controlled with Tylenol prn    RESP:   #COPD (on Breo Ellipta @ home)  -Symbicort and Albuterol prn  -satting well on RA  -Encourage IS  -f/up CXR in am  -Activity- OOB        CARDS:   #HTN  -continue home Atenolol  -keep normotensive  -ECG: NSR @ 73 bpm, QTc 414    GI/NUTR:   -keep NPO for now  -GI Prophylaxis- PPI  - Bowel regimen- Senna    /RENAL:        Monitor UO- no brown    -IVF: NS @ 75/hr    Labs:          BUN/Cr- 9/0.7  -->          Electrolytes-Na 136 // K 4.0 // Mg -- //  Phos -- (09-08 @ 15:00)  -Replete elctrolytes prn    HEME/ONC:       DVT prophylaxis- SCDs, hold Hep sq for now due to SAH    Labs: Hb/Hct:  11.3/34.5  -->                      Plts:  231  -->                 PTT/INR:  23.6/1.02  --->     ID:  WBC- 7.69  --->>  Temp trend- 24hrs T(F): 97.4 (09-08 @ 14:17), Max: 97.4 (09-08 @ 14:17)  -Covid neg    ENDO:  #hypothryoid  -continue home Synthroid  -   Glucose, Serum: 129 (09-08 @ 15:00)  -  HA1C in am    signed out to Maria Esther MOREIRA @ 12:30

## 2021-09-09 NOTE — PROGRESS NOTE ADULT - SUBJECTIVE AND OBJECTIVE BOX
RAJESH LOPEZ  [id number]  89y Female    Indication for ICU admission: s/p mechanical fall with small SAH    Admit Date:09-08-21  ICU Date:9/8/21    penicillin (Short breath; Hives)    PAST MEDICAL & SURGICAL HISTORY:  HTN (hypertension)    Hypothyroid    COPD (chronic obstructive pulmonary disease)    No significant past surgical history      Home Medications:  Albuterol (Eqv-ProAir HFA) 90 mcg/inh inhalation aerosol: 2 puff(s) inhaled every 6 hours, As Needed (08 Sep 2021 18:46)  atenolol 25 mg oral tablet: 1 tab(s) orally once a day (08 Sep 2021 18:44)  Breo Ellipta 100 mcg-25 mcg/inh inhalation powder: 1 puff(s) inhaled once a day (08 Sep 2021 18:47)  levothyroxine 75 mcg (0.075 mg) oral tablet: 1 tab(s) orally once a day (08 Sep 2021 18:44)        24HRS EVENT:  -rept CT hd pending in am, no acute MS changes    DVT PTX: SCDs    GI PTX: pantoprazole    Tablet 40 milliGRAM(s) Oral before breakfast      ***Tubes/Lines/Drains  ***  Peripheral IV      REVIEW OF SYSTEMS    [ x] A ten-point review of systems was otherwise negative except as noted.  [ ] Due to altered mental status/intubation, subjective information were not able to be obtained from the patient. History was obtained, to the extent possible, from review of the chart and collateral sources of information.       RAJESH LOPEZ  [id number]  89y Female    Indication for ICU admission: s/p mechanical fall with small SAH    Admit Date:09-08-21  ICU Date:9/8/21    penicillin (Short breath; Hives)    PAST MEDICAL & SURGICAL HISTORY:  HTN (hypertension)    Hypothyroid    COPD (chronic obstructive pulmonary disease)    No significant past surgical history      Home Medications:  Albuterol (Eqv-ProAir HFA) 90 mcg/inh inhalation aerosol: 2 puff(s) inhaled every 6 hours, As Needed (08 Sep 2021 18:46)  atenolol 25 mg oral tablet: 1 tab(s) orally once a day (08 Sep 2021 18:44)  Breo Ellipta 100 mcg-25 mcg/inh inhalation powder: 1 puff(s) inhaled once a day (08 Sep 2021 18:47)  levothyroxine 75 mcg (0.075 mg) oral tablet: 1 tab(s) orally once a day (08 Sep 2021 18:44)        24HRS EVENT:  -rept CT hd pending in am, no acute MS changes    DVT PTX: SCDs    GI PTX: pantoprazole    Tablet 40 milliGRAM(s) Oral before breakfast      ***Tubes/Lines/Drains  ***  Peripheral IV      REVIEW OF SYSTEMS    [ x] A ten-point review of systems was otherwise negative except as noted.  [ ] Due to altered mental status/intubation, subjective information were not able to be obtained from the patient. History was obtained, to the extent possible, from review of the chart and collateral sources of information.      Daily Height in cm: 157.48 (08 Sep 2021 14:17)    Daily     Diet, DASH/TLC:   Sodium & Cholesterol Restricted (09-09-21 @ 11:19)      CURRENT MEDS:  Neurologic Medications  acetaminophen   Tablet .. 650 milliGRAM(s) Oral every 6 hours  levETIRAcetam 500 milliGRAM(s) Oral two times a day    Respiratory Medications  ALBUTerol    90 MICROgram(s) HFA Inhaler 2 Puff(s) Inhalation every 6 hours PRN Shortness of Breath and/or Wheezing  budesonide  80 MICROgram(s)/formoterol 4.5 MICROgram(s) Inhaler 2 Puff(s) Inhalation daily    Cardiovascular Medications  ATENolol  Tablet 25 milliGRAM(s) Oral daily    Gastrointestinal Medications  pantoprazole    Tablet 40 milliGRAM(s) Oral before breakfast  senna 2 Tablet(s) Oral at bedtime    Genitourinary Medications    Hematologic/Oncologic Medications  heparin   Injectable 5000 Unit(s) SubCutaneous every 8 hours    Antimicrobial/Immunologic Medications    Endocrine/Metabolic Medications  levothyroxine 75 MICROGram(s) Oral daily    Topical/Other Medications  chlorhexidine 4% Liquid 1 Application(s) Topical <User Schedule>      ICU Vital Signs Last 24 Hrs  T(C): 36.1 (09 Sep 2021 08:44), Max: 36.4 (08 Sep 2021 22:17)  T(F): 97 (09 Sep 2021 08:44), Max: 97.5 (08 Sep 2021 22:17)  HR: 68 (09 Sep 2021 08:44) (68 - 82)  BP: 126/60 (09 Sep 2021 08:44) (125/56 - 167/72)  BP(mean): 86 (09 Sep 2021 08:44) (86 - 99)  ABP: --  ABP(mean): --  RR: 18 (09 Sep 2021 08:44) (18 - 20)  SpO2: 96% (09 Sep 2021 08:44) (96% - 99%)      Adult Advanced Hemodynamics Last 24 Hrs  CVP(mm Hg): --  CVP(cm H2O): --  CO: --  CI: --  PA: --  PA(mean): --  PCWP: --  SVR: --  SVRI: --  PVR: --  PVRI: --          I&O's Summary    08 Sep 2021 07:01  -  09 Sep 2021 07:00  --------------------------------------------------------  IN: 300 mL / OUT: 0 mL / NET: 300 mL      I&O's Detail    08 Sep 2021 07:01  -  09 Sep 2021 07:00  --------------------------------------------------------  IN:    sodium chloride 0.9%: 300 mL  Total IN: 300 mL    OUT:  Total OUT: 0 mL    Total NET: 300 mL          PHYSICAL EXAM:    General/Neuro  Deficits:  alert & oriented x 3, no focal deficits    Lungs: clear to auscultation, Normal expansion/effort.     Cardiovascular : S1, S2.  Regular rate and rhythm.  Peripheral edema   Cardiac Rhythm: Normal Sinus Rhythm    GI: Abdomen soft, Non-tender, Non-distended.      Extremities: Extremities warm, pink, well-perfused    Derm: Good skin turgor, no skin breakdown.      LABS:  CAPILLARY BLOOD GLUCOSE      POCT Blood Glucose.: 95 mg/dL (09 Sep 2021 00:19)                          11.3   7.69  )-----------( 231      ( 08 Sep 2021 15:00 )             34.5       09-08    136  |  97<L>  |  9<L>  ----------------------------<  129<H>  4.0   |  27  |  0.7    Ca    9.1      08 Sep 2021 15:00    TPro  6.3  /  Alb  3.9  /  TBili  0.2  /  DBili  x   /  AST  18  /  ALT  10  /  AlkPhos  70  09-08      PT/INR - ( 08 Sep 2021 15:00 )   PT: 11.70 sec;   INR: 1.02 ratio         PTT - ( 08 Sep 2021 15:00 )  PTT:23.6 sec

## 2021-09-09 NOTE — OCCUPATIONAL THERAPY INITIAL EVALUATION ADULT - SPECIFY REASON(S)
Attempted to see pt for OT evaluation, pt without activity orders at this time. Will follow up when orders updated

## 2021-09-09 NOTE — PROGRESS NOTE ADULT - SUBJECTIVE AND OBJECTIVE BOX
89 year old female with a pmh of hypothyroid copd htn & dementia presents here s/p fall down approximately 3 steps. + head trauma no loc not on ac. Daughter at bedside. Pt denied any headache. No other complaints.    ICU Vital Signs Last 24 Hrs  T(C): 36.1 (09 Sep 2021 08:44), Max: 36.4 (08 Sep 2021 22:17)  T(F): 97 (09 Sep 2021 08:44), Max: 97.5 (08 Sep 2021 22:17)  HR: 68 (09 Sep 2021 08:44) (68 - 82)  BP: 126/60 (09 Sep 2021 08:44) (125/56 - 167/72)  BP(mean): 86 (09 Sep 2021 08:44) (86 - 99)  ABP: --  ABP(mean): --  RR: 18 (09 Sep 2021 08:44) (18 - 20)  SpO2: 96% (09 Sep 2021 08:44) (96% - 99%)      Exam:  awake, alert and oriented x1 to name, confuse to place and date             follows simple commands             PERRL             no drift             moves all 4s spontaneously with good strength              sensation intact and symmetrical                             11.3   7.69  )-----------( 231      ( 08 Sep 2021 15:00 )             34.5     09-08    136  |  97<L>  |  9<L>  ----------------------------<  129<H>  4.0   |  27  |  0.7    Ca    9.1      08 Sep 2021 15:00    TPro  6.3  /  Alb  3.9  /  TBili  0.2  /  DBili  x   /  AST  18  /  ALT  10  /  AlkPhos  70  09-08

## 2021-09-09 NOTE — PROGRESS NOTE ADULT - ASSESSMENT
s/p traumatic subarachnoid hemorrhage  follow up CT head stable  can starts SQ heparin for DVT prophylaxis  PT/OT  d/c planning  follow up CT head in 2 weeks and follow up with Dr. Moore in office after CT head

## 2021-09-09 NOTE — CONSULT NOTE ADULT - SUBJECTIVE AND OBJECTIVE BOX
SICU Consultation Note  =====================================================  HPI:  88 y/o F w/PMHx of dementia, hypothyroidism, COPD (no home O2), HTN, who presents as a transfer from [facility], s/p mechanical trip and fall day of presentation, +HT, -LOC, -AC.  Per patient and her daughter, she was walking down the steps outside of her house when she fell forward and hit her head.  CT pan scan was performed at that time demonstrating a small foci of SAH in the right frontal sulci.  Patient was transferred to [place] for trauma and Neurosurgery evaluation.   In the ED patient is GCS 15, A&Ox3, vitals WNL. Primary survey is negative. On exposure, she has mild scalp swelling with ~2cm laceration above the right orbit --> s/p interrupted suture repair.  SICU/SDU called for close hemodynamic monitoring and neuro checks q4h.       PAST MEDICAL & SURGICAL HISTORY:  HTN (hypertension)    Hypothyroid    COPD (chronic obstructive pulmonary disease)    No significant past surgical history      Allergies  penicillin (Short breath; Hives)    SH: former smoker --> quit 30 yrs ago    Home Medications:  Albuterol (Eqv-ProAir HFA) 90 mcg/inh inhalation aerosol: 2 puff(s) inhaled every 6 hours, As Needed (08 Sep 2021 18:46)  atenolol 25 mg oral tablet: 1 tab(s) orally once a day (08 Sep 2021 18:44)  Breo Ellipta 100 mcg-25 mcg/inh inhalation powder: 1 puff(s) inhaled once a day (08 Sep 2021 18:47)  levothyroxine 75 mcg (0.075 mg) oral tablet: 1 tab(s) orally once a day (08 Sep 2021 18:44)    Advanced Directives: Full Code     ROS:  [x ] A ten-point review of systems was otherwise negative except as noted.  [ ] Due to altered mental status/intubation, subjective information were not able to be obtained from the patient. History was obtained, to the extent possible, from review of the chart and collateral sources of information.      CURRENT MEDICATIONS:   --------------------------------------------------------------------------------------  Neurologic Medications  acetaminophen   Tablet .. 650 milliGRAM(s) Oral every 6 hours  levETIRAcetam  IVPB 1000 milliGRAM(s) IV Intermittent every 12 hours  levETIRAcetam  IVPB 500 milliGRAM(s) IV Intermittent every 12 hours    Respiratory Medications  ALBUTerol    90 MICROgram(s) HFA Inhaler 2 Puff(s) Inhalation every 6 hours PRN Shortness of Breath and/or Wheezing  budesonide  80 MICROgram(s)/formoterol 4.5 MICROgram(s) Inhaler 2 Puff(s) Inhalation daily    Cardiovascular Medications  ATENolol  Tablet 25 milliGRAM(s) Oral daily    Gastrointestinal Medications  pantoprazole    Tablet 40 milliGRAM(s) Oral before breakfast  senna 2 Tablet(s) Oral at bedtime  sodium chloride 0.9%. 1000 milliLiter(s) IV Continuous <Continuous>    Genitourinary Medications    Hematologic/Oncologic Medications    Antimicrobial/Immunologic Medications    Endocrine/Metabolic Medications  levothyroxine 75 MICROGram(s) Oral daily    Topical/Other Medications  chlorhexidine 4% Liquid 1 Application(s) Topical <User Schedule>    --------------------------------------------------------------------------------------    Vital Signs Last 24 Hrs  T(C): 36.3 (08 Sep 2021 14:17), Max: 36.3 (08 Sep 2021 14:17)  T(F): 97.4 (08 Sep 2021 14:17), Max: 97.4 (08 Sep 2021 14:17)  HR: 82 (08 Sep 2021 16:13) (76 - 82)  BP: 167/72 (08 Sep 2021 16:13) (140/64 - 167/72)  BP(mean): --  RR: 20 (08 Sep 2021 16:13) (20 - 20)  SpO2: 99% (08 Sep 2021 16:13) (99% - 99%)      LABS:                    11.3   7.69  )-----------( 231      ( 08 Sep 2021 15:00 )             34.5     09-08    136  |  97<L>  |  9<L>  ----------------------------<  129<H>  4.0   |  27  |  0.7    Ca    9.1      08 Sep 2021 15:00    TPro  6.3  /  Alb  3.9  /  TBili  0.2  /  DBili  x   /  AST  18  /  ALT  10  /  AlkPhos  70  09-08    LIVER FUNCTIONS - ( 08 Sep 2021 15:00 )  Alb: 3.9 g/dL / Pro: 6.3 g/dL / ALK PHOS: 70 U/L / ALT: 10 U/L / AST: 18 U/L / GGT: x           PT/INR - ( 08 Sep 2021 15:00 )   PT: 11.70 sec;   INR: 1.02 ratio    PTT - ( 08 Sep 2021 15:00 )  PTT:23.6 sec      EXAM:  General/Neuro  GCS: 15  Exam: NAD, alert, oriented x 3, no focal deficits. PERRLA, EOMI, KAUR, tongue midline, no facial assymmetry, 5/5 motor strength b/l.  Small lack (2cm) above right eye sutured @ [place] site    Respiratory  Exam: Lungs clear to auscultation, Normal expansion/effort.      Cardiovascular  Exam: S1, S2.  Regular rate and rhythm.   Cardiac Rhythm: Normal Sinus Rhythm    GI  Exam: Abdomen soft, Non-tender, Non-distended.      Extremities  Exam: Extremities warm, well-perfused.  No Peripheral edema b/l LE  Right shoulder, right elbow, and left knee abrasions noted    Derm:  Exam: Good skin turgor, no skin breakdown.      :   Exam: No Tucker catheter.     Tubes/Lines/Drains  ***  [x] Peripheral IV            
HPI:  89F w/PMHx of hypothyroidism, COPD, HTN, dementia presents s/p mechanical trip and fall day of presentation, +HT, -LOC, -AC. Per patient and her daughter, she was walking down the steps outside of her house when she fell forward and hit her head. Denies loss of consciousness. Patient presented to the [facility] site for evaluation, vitals WNL. A CT pan scan was performed at that time demonstrating a small foci of subarachnoid hemorrhage in the right frontal sulci. Patient was transferred to [place] for trauma and Neurosurgery evaluation. In the ED patient is GCS 15, A&Ox3, vitals WNL. Primary survey is negative. On exposure she has mild scalp swelling with ~2cm laceration above the right orbit s/p interrupted suture repair. She also has skin tears over the right elbow and left knee. Patient denies any pain at this time, denies scalp, spinal, chest, abdominal, or extremity tenderness (is mildly agitated).        PAST MEDICAL & SURGICAL HISTORY:  HTN (hypertension)    Hypothyroid    COPD (chronic obstructive pulmonary disease)    No significant past surgical history        Hospital Course:    TODAY'S SUBJECTIVE & REVIEW OF SYMPTOMS:     Constitutional WNL   Cardio WNL   Resp WNL   GI WNL  Heme WNL  Endo WNL  Skin WNL  MSK Weakness  Neuro WNL  Cognitive WNL  Psych WNL      MEDICATIONS  (STANDING):  acetaminophen   Tablet .. 650 milliGRAM(s) Oral every 6 hours  ATENolol  Tablet 25 milliGRAM(s) Oral daily  budesonide  80 MICROgram(s)/formoterol 4.5 MICROgram(s) Inhaler 2 Puff(s) Inhalation daily  chlorhexidine 4% Liquid 1 Application(s) Topical <User Schedule>  heparin   Injectable 5000 Unit(s) SubCutaneous every 8 hours  levETIRAcetam 500 milliGRAM(s) Oral two times a day  levothyroxine 75 MICROGram(s) Oral daily  pantoprazole    Tablet 40 milliGRAM(s) Oral before breakfast  senna 2 Tablet(s) Oral at bedtime  sodium chloride 0.9%. 1000 milliLiter(s) (75 mL/Hr) IV Continuous <Continuous>    MEDICATIONS  (PRN):  ALBUTerol    90 MICROgram(s) HFA Inhaler 2 Puff(s) Inhalation every 6 hours PRN Shortness of Breath and/or Wheezing      FAMILY HISTORY:      Allergies    penicillin (Short breath; Hives)    Intolerances        SOCIAL HISTORY:    [  ] Etoh  [  ] Smoking  [  ] Substance abuse     Home Environment:  [   ] Home Alone  [ x  ] Lives with Family  [   ] Home Health Aid    Dwelling:  [   ] Apartment  [x   ] Private House  [   ] Adult Home  [   ] Skilled Nursing Facility      [   ] Short Term  [   ] Long Term  [  x ] Stairs       Elevator [   ]    FUNCTIONAL STATUS PTA: (Check all that apply)  Ambulation: [ x   ]Independent    [   ] Dependent     [   ] Non-Ambulatory  Assistive Device: [   ] SA Cane  [   ]  Q Cane  [   ] Walker  [   ]  Wheelchair  ADL : [x   ] Independent  [    ]  Dependent       Vital Signs Last 24 Hrs  T(C): 36.1 (09 Sep 2021 08:44), Max: 36.4 (08 Sep 2021 22:17)  T(F): 97 (09 Sep 2021 08:44), Max: 97.5 (08 Sep 2021 22:17)  HR: 68 (09 Sep 2021 08:44) (68 - 82)  BP: 126/60 (09 Sep 2021 08:44) (125/56 - 167/72)  BP(mean): 86 (09 Sep 2021 08:44) (86 - 99)  RR: 18 (09 Sep 2021 08:44) (18 - 20)  SpO2: 96% (09 Sep 2021 08:44) (96% - 99%)      PHYSICAL EXAM: Awake & Alert  GENERAL: NAD  HEAD:  Normocephalic  CHEST/LUNG: Clear   HEART: S1S2+  ABDOMEN: Soft, Nontender  EXTREMITIES:  no calf tenderness    NERVOUS SYSTEM:  Cranial Nerves 2-12 intact [   ] Abnormal  [   ]  ROM: WFL all extremities [ x  ]  Abnormal [   ]  Motor Strength: WFL all extremities  [  x ]  Abnormal [   ]  Sensation: intact to light touch [ x  ] Abnormal [   ]    FUNCTIONAL STATUS:  Bed Mobility: Independent [   ]  Supervision [   ]  Needs Assistance [  x ]  N/A [   ]  Transfers: Independent [   ]  Supervision [   ]  Needs Assistance [ x  ]  N/A [   ]   Ambulation: Independent [   ]  Supervision [   ]  Needs Assistance [   ]  N/A [   ]  ADL: Independent [   ] Requires Assistance [   ] N/A [   ]      LABS:                        11.3   7.69  )-----------( 231      ( 08 Sep 2021 15:00 )             34.5     09-08    136  |  97<L>  |  9<L>  ----------------------------<  129<H>  4.0   |  27  |  0.7    Ca    9.1      08 Sep 2021 15:00    TPro  6.3  /  Alb  3.9  /  TBili  0.2  /  DBili  x   /  AST  18  /  ALT  10  /  AlkPhos  70  09-08    PT/INR - ( 08 Sep 2021 15:00 )   PT: 11.70 sec;   INR: 1.02 ratio         PTT - ( 08 Sep 2021 15:00 )  PTT:23.6 sec      RADIOLOGY & ADDITIONAL STUDIES:  
NEUROSURGERY CONSULT  RAJESH LOPEZ   09-08-21 @ 18:02    HPI: 89 year old female with a pmh of hypothyroid copd htn & dementia presents here s/p fall down approximately 3 steps. + head trauma no loc not on ac. Daughter at bedside. Currently c/o pain in head knee right elbow pain. Tetanas status unknown.    Neurosurgery was consulted for patient s/p fall today with CTH showing small R frontal tSAH. Patient seen and examined at bedside with Dr. Moore and with daughter at bedside. Patient denies any HA, N/V, no AC/AP.     RADIOLOGY:   < from: CT Head No Cont (09.08.21 @ 15:38) >  Findings:    There is motion artifact on this exam.    There is small foci of subarachnoid hemorrhage within the right frontal sulci, series 2 images 23 and 18.    The ventricles and cortical sulci are compatible with a moderate degree of parenchymal volume loss.    There are patchy and confluent hypodensities throughout the hemispheric white matter without mass effect compatible with chronic microvascular changes.    There is no midline shift.  Gray white matter differentiation is maintained.    There is calcific atherosclerotic disease at the skull base.    There is scattered paranasal sinus mucosal thickening. Please see accompanying maxillofacial CT.    Right supraorbital region soft tissue swelling is noted.    IMPRESSION:    1.  Small foci of subarachnoid hemorrhage in the right frontal sulci.    2.  Moderate/severe chronic microvascular changes.    3.  Right supraorbital region scalp soft tissue swelling.    MEDS:  acetaminophen   Tablet .. 975 milliGRAM(s) Oral Once  levETIRAcetam  IVPB 1000 milliGRAM(s) IV Intermittent every 12 hours      PHYSICAL EXAM:  Vital Signs Last 24 Hrs  T(C): 36.3 (08 Sep 2021 14:17), Max: 36.3 (08 Sep 2021 14:17)  T(F): 97.4 (08 Sep 2021 14:17), Max: 97.4 (08 Sep 2021 14:17)  HR: 82 (08 Sep 2021 16:13) (76 - 82)  BP: 167/72 (08 Sep 2021 16:13) (140/64 - 167/72)  BP(mean): --  RR: 20 (08 Sep 2021 16:13) (20 - 20)  SpO2: 99% (08 Sep 2021 16:13) (99% - 99%)    AAOx4  EOMI, PERRL  Face symemtrical  KAUR x4 with good strength   sensation intact  No drift    LABS:                        11.3   7.69  )-----------( 231      ( 08 Sep 2021 15:00 )             34.5     09-08    136  |  97<L>  |  9<L>  ----------------------------<  129<H>  4.0   |  27  |  0.7    Ca    9.1      08 Sep 2021 15:00    TPro  6.3  /  Alb  3.9  /  TBili  0.2  /  DBili  x   /  AST  18  /  ALT  10  /  AlkPhos  70  09-08  PT/INR - ( 08 Sep 2021 15:00 )   PT: 11.70 sec;   INR: 1.02 ratio    PTT - ( 08 Sep 2021 15:00 )  PTT:23.6 sec

## 2021-09-10 VITALS — WEIGHT: 89.95 LBS

## 2021-09-10 LAB — GLUCOSE BLDC GLUCOMTR-MCNC: 114 MG/DL — HIGH (ref 70–99)

## 2021-09-10 PROCEDURE — 71045 X-RAY EXAM CHEST 1 VIEW: CPT | Mod: 26

## 2021-09-10 PROCEDURE — 99232 SBSQ HOSP IP/OBS MODERATE 35: CPT

## 2021-09-10 RX ORDER — LEVETIRACETAM 250 MG/1
1 TABLET, FILM COATED ORAL
Qty: 10 | Refills: 0
Start: 2021-09-10 | End: 2021-09-14

## 2021-09-10 RX ADMIN — PANTOPRAZOLE SODIUM 40 MILLIGRAM(S): 20 TABLET, DELAYED RELEASE ORAL at 05:20

## 2021-09-10 RX ADMIN — Medication 650 MILLIGRAM(S): at 05:19

## 2021-09-10 RX ADMIN — HEPARIN SODIUM 5000 UNIT(S): 5000 INJECTION INTRAVENOUS; SUBCUTANEOUS at 05:19

## 2021-09-10 RX ADMIN — ATENOLOL 25 MILLIGRAM(S): 25 TABLET ORAL at 05:20

## 2021-09-10 RX ADMIN — LEVETIRACETAM 500 MILLIGRAM(S): 250 TABLET, FILM COATED ORAL at 05:19

## 2021-09-10 RX ADMIN — Medication 75 MICROGRAM(S): at 05:20

## 2021-09-10 RX ADMIN — Medication 650 MILLIGRAM(S): at 05:59

## 2021-09-10 NOTE — DISCHARGE NOTE PROVIDER - CARE PROVIDER_API CALL
Treasure Moore)  [facility]  [address] [place] [address]  Phone: [phone]  Fax: [phone]  Follow Up Time: 2 weeks

## 2021-09-10 NOTE — DIETITIAN INITIAL EVALUATION ADULT. - ADD RECOMMEND
Continue current diet order. Add ensure enlive BID to diet order to optimize energy and protein intake.

## 2021-09-10 NOTE — DIETITIAN INITIAL EVALUATION ADULT. - NAME AND PHONE
RD to monitor: diet order, body composition, energy intake, nutrition focused physical finding: not at risk. consult nutrition prn

## 2021-09-10 NOTE — DIETITIAN INITIAL EVALUATION ADULT. - ORAL INTAKE PTA/DIET HISTORY
Spoke to patient and daughter at bedside. Daughter reports patient eats domenic at baseline. Reports that she has mild dementia and the daughter reports that she has noticed has became very picky and only eats very specific foods each day but still si consuming 3 meals per day. Denies use of oral nutrition supplements, vitamins, or minerals. Confirms NKFA. No Baptism or cultural food preferences. Denies difficulties chewing or swallowing.

## 2021-09-10 NOTE — PROGRESS NOTE ADULT - ASSESSMENT
ASSESSMENT:  89 y/o F w/PMHx of dementia, hypothyroidism, COPD (no home O2), HTN, who presents as a transfer from [facility], s/p mechanical trip and fall with small foci of SAH in the right frontal sulci.     PLAN:   -Per Neurosurgery, Q4 hour neuro checks   -1g Keppra load in ED followed by 500mg BID for 7 days  -Continue home medications   -Adequate pain control  -GI ppx   -PT/Rehab/SW     Lines/Tubes: PIV    TRAUMA SPECTRA: [id number] ASSESSMENT:  88 y/o F w/PMHx of dementia, hypothyroidism, COPD (no home O2), HTN, who presents as a transfer from [facility], s/p mechanical trip and fall with small foci of SAH in the right frontal sulci.     SADIQ   Lines/Tubes: PIV    TRAUMA SPECTRA: [id number]

## 2021-09-10 NOTE — PHYSICAL THERAPY INITIAL EVALUATION ADULT - GENERAL OBSERVATIONS, REHAB EVAL
Pt encountered in the b/s chair, NAD, + daughter at b/s, agreeable for b/s PT. Zachery. well to tx. Pt left in the b/s chair post tx all needs within reach +daughter at b/s.  THOMAS Rueda made aware.

## 2021-09-10 NOTE — PROGRESS NOTE ADULT - SUBJECTIVE AND OBJECTIVE BOX
GENERAL SURGERY PROGRESS NOTE    Patient: RAJESH LOPEZ , 89y ([birth year])Female   MRN: [id number]  Location: [address]  Visit: 09-08-21 Inpatient  Date: 09-10-21 @ 07:54    Hospital Day #: 3  Post-Op Day #: None    Procedure/Dx/Injuries: 89 y/o F w/PMHx of dementia, hypothyroidism, COPD (no home O2), HTN, who presents as a transfer from [facility], s/p mechanical trip and fall with small foci of SAH in the right frontal sulci.     Events of past 24 hours: Patient downgraded from SICU    PAST MEDICAL & SURGICAL HISTORY:  HTN (hypertension)    Hypothyroid    COPD (chronic obstructive pulmonary disease)    No significant past surgical history      Vitals:   T(F): 96.7 (09-10-21 @ 05:00), Max: 97.2 (09-09-21 @ 12:00)  HR: 77 (09-10-21 @ 05:00)  BP: 134/66 (09-10-21 @ 05:00)  RR: 18 (09-10-21 @ 05:00)  SpO2: 98% (09-10-21 @ 05:00)      Diet, DASH/TLC:   Sodium & Cholesterol Restricted      Fluids:     I & O's:    09-09-21 @ 07:01  -  09-10-21 @ 07:00  --------------------------------------------------------  IN:    Oral Fluid: 100 mL  Total IN: 100 mL    OUT:  Total OUT: 0 mL    Total NET: 100 mL    Bowel Movement: : [] YES [x] NO  Flatus: : [x] YES [] NO    PHYSICAL EXAM:  GENERAL: NAD  HEENT: ~2 cm laceration above the right orbit, s/p repair with interrupted suture without underlying hematoma. Ecchymoses of the right lower eyelids   CHEST/LUNG: Clear to auscultation bilaterally, no tenderness at the chest wall, no subcutaneous emphysema   HEART: Regular rate and rhythm  ABDOMEN: Soft, Nontender, Nondistended  EXTREMITIES:  No clubbing, cyanosis, or edema. Skin tears at the right arm and left knee      MEDICATIONS  (STANDING):  acetaminophen   Tablet .. 650 milliGRAM(s) Oral every 6 hours  ATENolol  Tablet 25 milliGRAM(s) Oral daily  budesonide  80 MICROgram(s)/formoterol 4.5 MICROgram(s) Inhaler 2 Puff(s) Inhalation daily  chlorhexidine 4% Liquid 1 Application(s) Topical <User Schedule>  heparin   Injectable 5000 Unit(s) SubCutaneous every 8 hours  levETIRAcetam 500 milliGRAM(s) Oral two times a day  levothyroxine 75 MICROGram(s) Oral daily  pantoprazole    Tablet 40 milliGRAM(s) Oral before breakfast  senna 2 Tablet(s) Oral at bedtime    MEDICATIONS  (PRN):  ALBUTerol    90 MICROgram(s) HFA Inhaler 2 Puff(s) Inhalation every 6 hours PRN Shortness of Breath and/or Wheezing      DVT PROPHYLAXIS: heparin   Injectable 5000 Unit(s) SubCutaneous every 8 hours    GI PROPHYLAXIS: pantoprazole    Tablet 40 milliGRAM(s) Oral before breakfast    ANTICOAGULATION:   ANTIBIOTICS:            LAB/STUDIES:  Labs:  CAPILLARY BLOOD GLUCOSE      POCT Blood Glucose.: 105 mg/dL (09 Sep 2021 16:35)                          11.9   7.24  )-----------( 201      ( 09 Sep 2021 12:22 )             37.4         09-09    135  |  100  |  8<L>  ----------------------------<  111<H>  4.3   |  28  |  0.6<L>      Calcium, Total Serum: 8.8 mg/dL (09-09-21 @ 12:22)      LFTs:             6.3  | 0.2  | 18       ------------------[70      ( 08 Sep 2021 15:00 )  3.9  | x    | 10          Lipase:50     Amylase:x         Lactate, Blood: 2.0 mmol/L (09-08-21 @ 15:00)    Coags:     11.70  ----< 1.02    ( 08 Sep 2021 15:00 )     23.6      CARDIAC MARKERS ( 09 Sep 2021 12:22 )  x     / <0.01 ng/mL / x     / x     / x        ACCESS/ DEVICES:  [x ] Peripheral IV

## 2021-09-10 NOTE — OCCUPATIONAL THERAPY INITIAL EVALUATION ADULT - GENERAL OBSERVATIONS, REHAB EVAL
Pt encountered seated in bedside chair eating breakfast, + IV locked, + daughter present at bedside. Pt agreeable to OT initial assessment. May be seen as confirmed with RN. Pt returned to bedside chair as found, + IV locked. + daughter present at bedside

## 2021-09-10 NOTE — OCCUPATIONAL THERAPY INITIAL EVALUATION ADULT - TRANSFER TRAINING, PT EVAL
Pt and caregiver will be educated on use of DME (shower seat and grab bars) to increase independence and safety with toilet and shower transfers by discharge

## 2021-09-10 NOTE — DIETITIAN INITIAL EVALUATION ADULT. - PHYSCIAL ASSESSMENT
alert and oriented/underweight skin intact - no edema noted- no chewing/swallowing difficulties reported per pt- No N/V/C/D at this time, LBM: 9/9 per pt.

## 2021-09-10 NOTE — DIETITIAN INITIAL EVALUATION ADULT. - PERTINENT MEDS FT
MEDICATIONS  (STANDING):  ATENolol  Tablet 25 milliGRAM(s) Oral daily  levothyroxine 75 MICROGram(s) Oral daily  pantoprazole    Tablet 40 milliGRAM(s) Oral before breakfast  senna 2 Tablet(s) Oral at bedtime

## 2021-09-10 NOTE — DIETITIAN INITIAL EVALUATION ADULT. - CONTINUE CURRENT NUTRITION CARE PLAN
GOAL: Patient to continue to consume 50-75% of meals throughout LOS .  Intervention: meals and snacks, medical nutrition supplements

## 2021-09-10 NOTE — DISCHARGE NOTE NURSING/CASE MANAGEMENT/SOCIAL WORK - PATIENT PORTAL LINK FT
You can access the FollowMyHealth Patient Portal offered by [facility] by registering at the following website: [email or web address]. By joining [employer]’s FollowMyHealth portal, you will also be able to view your health information using other applications (apps) compatible with our system.

## 2021-09-10 NOTE — OCCUPATIONAL THERAPY INITIAL EVALUATION ADULT - PARENT CAREGIVER TRAINING, OT EVAL
Pt and caregiver will be educated on available assistive technology to promote independence within home while decreasing caregiver burden

## 2021-09-10 NOTE — DISCHARGE NOTE PROVIDER - NSDCCPCAREPLAN_GEN_ALL_CORE_FT
PRINCIPAL DISCHARGE DIAGNOSIS  Diagnosis: Subarachnoid hemorrhage  Assessment and Plan of Treatment: You suffered a fall and were found to have a small right sided subarachnoid hemorrhage (bleed in brain).   You were transferred to the [place] site for evaluation by trauma and neurosurgery.   Repeat Scan showed no increase in the size of the bleed.   You worked with Physical therapy and rehab and will be discharged home with home PT. Rolling walker will be delivered to you.   Medication called Keppra was sent to your [facility] pharmacy. This is to prevent seizures. Please take 2x per day for 5 days.   You may continue taking all home medications as previously prescribed.   You will follow up with Dr. Moore (Neurosurgery) in 2 weeks in the office.   **Prior to appointment you will need an outpatient CT scan of the head for Dr. Moore to review in office. Script will be given to you for CT scan.   If you experience severe headache, change in memory and mental status, numbness in extremities- call office or report to Emergency Department.      SECONDARY DISCHARGE DIAGNOSES  Diagnosis: Laceration of head  Assessment and Plan of Treatment:     Diagnosis: Abrasion  Assessment and Plan of Treatment:     Diagnosis: Skin tear of elbow without complication, initial encounter  Assessment and Plan of Treatment:

## 2021-09-10 NOTE — DIETITIAN INITIAL EVALUATION ADULT. - OTHER INFO
Pertinent medical information: 89F w/PMHx of hypothyroidism, COPD, HTN, dementia presents s/p mechanical trip and fall day of presentation, +HT, -LOC, -AC. Small foci of subarachnoid hemorrhage in the right frontal sulci noted. Trauma surgery noted    Pertinent nutrition information: Patient currently eating normal to baseline. No factors reported affecting appetite at this time    Daughter reports UBW ~ 90 lbs. Daughter reports that she has always been on the thin side and she would like her to maybe try nutrition supplement this way her weight is stable and dosnt trend down however no weight loss reported

## 2021-09-10 NOTE — OCCUPATIONAL THERAPY INITIAL EVALUATION ADULT - ADDITIONAL COMMENTS
Pt lives in 2 family house, resides on 1st floor of dwelling with daughter and her family upstairs, + stall shower, + home monitoring video system, gas turned off, uses microwave, hotplate.

## 2021-09-10 NOTE — DIETITIAN INITIAL EVALUATION ADULT. - PERSON TAUGHT/METHOD
Discussed with daughter and pt different ways to increase energy and protein intake/verbal instruction/patient instructed/daughter instructed

## 2021-09-10 NOTE — PHYSICAL THERAPY INITIAL EVALUATION ADULT - PERTINENT HX OF CURRENT PROBLEM, REHAB EVAL
89 y/o F w/PMHx of dementia, hypothyroidism, COPD (no home O2), HTN, who presents as a transfer from [facility], s/p mechanical trip and fall with small foci of SAH in the right frontal sulci.

## 2021-09-10 NOTE — DISCHARGE NOTE PROVIDER - HOSPITAL COURSE
89 y/o F with pmhx of COPD, HTN, hypothyroidism, dementia presented to the ED as a Trauma Transfer from [facility] s/p fall +HT, -LOC, -AC. Pt was found on imaging to have small right frontal SAH. Pt was transferred [place] for trauma and neurosurgical intervention.   Pt was admitted to surgical step down unit for neurochecks and monitoring.     Neurosurgery recommended Keppra BID for 7 days, and repeat CTH in 12 hours. Repeat scan was stable with no interval change in size of bleed.   Pt was then downgraded to floor. PT and rehab evaluated patient.   Pt will be discharged home with home PT. Pt has family home to care for her 24 hours a day.     Will follow up with Neuro sx in 2 weeks with outpatient CTH.

## 2021-09-10 NOTE — PROGRESS NOTE ADULT - REASON FOR ADMISSION
S/p mechanical fall, +HT, -LOC, -AC

## 2021-09-10 NOTE — OCCUPATIONAL THERAPY INITIAL EVALUATION ADULT - PATIENT PROFILE REVIEW, REHAB EVAL
Evaluation Attempted: 15:05pm; pt chart thoroughly reviewed prior to OT evaluation/yes
Time seen 7:45-08:25am pt chart thoroughly reviewed prior to OT evaluation/yes

## 2021-09-10 NOTE — DISCHARGE NOTE PROVIDER - NSDCMRMEDTOKEN_GEN_ALL_CORE_FT
Albuterol (Eqv-ProAir HFA) 90 mcg/inh inhalation aerosol: 2 puff(s) inhaled every 6 hours, As Needed  atenolol 25 mg oral tablet: 1 tab(s) orally once a day  Breo Ellipta 100 mcg-25 mcg/inh inhalation powder: 1 puff(s) inhaled once a day  levETIRAcetam 500 mg oral tablet: 1 tab(s) orally 2 times a day  levothyroxine 75 mcg (0.075 mg) oral tablet: 1 tab(s) orally once a day

## 2021-09-10 NOTE — OCCUPATIONAL THERAPY INITIAL EVALUATION ADULT - PERTINENT HX OF CURRENT PROBLEM, REHAB EVAL
89F w/PMHx of hypothyroidism, COPD, HTN, dementia presents s/p mechanical trip and fall day of presentation, +HT, -LOC, -AC. Per patient and her daughter, she was walking down the steps outside of her house when she fell forward and hit her head.

## 2021-09-10 NOTE — PROGRESS NOTE ADULT - ATTENDING COMMENTS
89 y/o F w/PMHx of dementia, hypothyroidism, COPD (no home O2), HTN, who presents as a transfer from [facility], s/p mechanical trip and fall with small foci of SAH in the right frontal sulci.   repeat ct head stable.   patient with no complaints. as per daughter patient is at baseline pleasantly confused.   Clear for discharge home with daughter as requested by patient and daughter
repeat CT scan of the head was stable.  Therefore, okay to initiate heparin for DVT prophylaxis.  Follow-up in approximately 2 weeks with a head CT to ensure that there is no evolution of chronic subdural hematoma
Patient remains neuro intact.   GCS 15.  Pain controlled.    ASSESSMENT:  89 y/o female, S/P Fall.  Traumatic Right SAH.  Right arm contusion.  Forehead abrasion.  HTN  COPD.  Dementia.  At risk for hemodynamic instability.  Frail elderly.    PLAN:  - intermittent neuro checks  - repeat CT head to follow  - encourage incentive spirometer  - keep normotensive  - swallow test at bedside to start po diet  - follow serum electrolytes and UOP  - DVT prophylaxis

## 2021-09-10 NOTE — DIETITIAN INITIAL EVALUATION ADULT. - OTHER CALCULATIONS
Weight used: 40.8 kg -- dosing weight --protein needs: consider underweight BMI.  Fluid needs: per LIP

## 2021-09-10 NOTE — DISCHARGE NOTE NURSING/CASE MANAGEMENT/SOCIAL WORK - NSDCVIVACCINE_GEN_ALL_CORE_FT
Tdap; 08-Sep-2021 16:15; Rita Rodney (RN); [employer]; [id number] (Exp. Date: 18-Nov-2022); IntraMuscular; Deltoid Right.; 0.5 milliLiter(s); VIS (VIS Published: 09-May-2013, VIS Presented: 08-Sep-2021);

## 2021-10-17 ENCOUNTER — EMERGENCY (EMERGENCY)
Facility: HOSPITAL | Age: 86
LOS: 0 days | Discharge: HOME | End: 2021-10-17
Attending: EMERGENCY MEDICINE | Admitting: EMERGENCY MEDICINE
Payer: MEDICARE

## 2021-10-17 VITALS
TEMPERATURE: 98 F | RESPIRATION RATE: 18 BRPM | DIASTOLIC BLOOD PRESSURE: 87 MMHG | HEIGHT: 62 IN | HEART RATE: 80 BPM | OXYGEN SATURATION: 96 % | SYSTOLIC BLOOD PRESSURE: 186 MMHG

## 2021-10-17 DIAGNOSIS — I10 ESSENTIAL (PRIMARY) HYPERTENSION: ICD-10-CM

## 2021-10-17 DIAGNOSIS — J44.9 CHRONIC OBSTRUCTIVE PULMONARY DISEASE, UNSPECIFIED: ICD-10-CM

## 2021-10-17 DIAGNOSIS — E03.9 HYPOTHYROIDISM, UNSPECIFIED: ICD-10-CM

## 2021-10-17 DIAGNOSIS — Z87.891 PERSONAL HISTORY OF NICOTINE DEPENDENCE: ICD-10-CM
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
## 2021-10-17 DIAGNOSIS — M25.511 PAIN IN RIGHT SHOULDER: ICD-10-CM

## 2021-10-17 DIAGNOSIS — F03.90 UNSPECIFIED DEMENTIA WITHOUT BEHAVIORAL DISTURBANCE: ICD-10-CM

## 2021-10-17 DIAGNOSIS — S50.311A ABRASION OF RIGHT ELBOW, INITIAL ENCOUNTER: ICD-10-CM

## 2021-10-17 DIAGNOSIS — S42.214A UNSPECIFIED NONDISPLACED FRACTURE OF SURGICAL NECK OF RIGHT HUMERUS, INITIAL ENCOUNTER FOR CLOSED FRACTURE: ICD-10-CM

## 2021-10-17 DIAGNOSIS — S00.211A ABRASION OF RIGHT EYELID AND PERIOCULAR AREA, INITIAL ENCOUNTER: ICD-10-CM
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
## 2021-10-17 DIAGNOSIS — S50.811A ABRASION OF RIGHT FOREARM, INITIAL ENCOUNTER: ICD-10-CM

## 2021-10-17 DIAGNOSIS — Z88.0 ALLERGY STATUS TO PENICILLIN: ICD-10-CM

## 2021-10-17 PROCEDURE — 99285 EMERGENCY DEPT VISIT HI MDM: CPT

## 2021-10-17 PROCEDURE — 70450 CT HEAD/BRAIN W/O DYE: CPT | Mod: 26,MA

## 2021-10-17 PROCEDURE — 72125 CT NECK SPINE W/O DYE: CPT | Mod: 26,MA

## 2021-10-17 PROCEDURE — 73060 X-RAY EXAM OF HUMERUS: CPT | Mod: 26,RT

## 2021-10-17 PROCEDURE — 73030 X-RAY EXAM OF SHOULDER: CPT | Mod: 26,RT

## 2021-10-17 PROCEDURE — 71045 X-RAY EXAM CHEST 1 VIEW: CPT | Mod: 26

## 2021-10-17 RX ORDER — ACETAMINOPHEN 500 MG
975 TABLET ORAL ONCE
Refills: 0 | Status: COMPLETED | OUTPATIENT
Start: 2021-10-17 | End: 2021-10-17

## 2021-10-17 RX ADMIN — Medication 975 MILLIGRAM(S): at 17:08

## 2021-10-17 NOTE — ED PROVIDER NOTE - PATIENT PORTAL LINK FT
You can access the FollowMyHealth Patient Portal offered by [facility] by registering at the following website: [email or web address]. By joining [facility]’s FollowMyHealth portal, you will also be able to view your health information using other applications (apps) compatible with our system.

## 2021-10-17 NOTE — ED ADULT NURSE NOTE - ED CARDIAC RHYTHM
MyChart message sent to patient informing him that the future labs do require to fast. And that the information that he received about the A1C is correct.   regular

## 2021-10-17 NOTE — ED PROVIDER NOTE - PHYSICAL EXAMINATION
CONSTITUTIONAL: Well-developed; well-nourished; in no acute distress.   SKIN: warm, dry, +abrasions over right elbow and forearm  HEAD: Normocephalic; +abrasions over right eyebrow  EYES: no conjunctival injection. PERRLA. EOMI.   ENT: No nasal discharge; airway clear.  NECK: Supple; non tender.  CARD: S1, S2 normal; no murmurs, gallops, or rubs. Regular rate and rhythm. No chest wall tenderness  RESP: No wheezes, rales or rhonchi.  ABD: soft ntnd. BS+ in all 4 quadrants.  EXT: +right shoulder tenderness and limited ROM due to pain.  NEURO: Alert, oriented, grossly unremarkable.  PSYCH: Cooperative, appropriate.

## 2021-10-17 NOTE — ED PROVIDER NOTE - OBJECTIVE STATEMENT
89 yo female, PMHx of SAH due to fall, dementia, htn, copd, hypothyroidism, presents with sharp right shoulder pain, non-radiating, intermittent, worse with movement, alleviated by rest, no other associated symptoms, s/p mechanical fall down 4 metal steps. Denies LOC, nausea, vomiting, dizziness, numbness, chest pain, SOB, abdominal pain. TDAP UTD.

## 2021-10-17 NOTE — ED ADULT NURSE NOTE - OBJECTIVE STATEMENT
s/p witnessed fall at home fell down approximately 4 steps, c/o right arm pain. Pt presents with abrasions to right eyebrow. Denies LOC or AC

## 2021-10-17 NOTE — ED PROVIDER NOTE - CLINICAL SUMMARY MEDICAL DECISION MAKING FREE TEXT BOX
sling applied. will refer to ortho for furhter management and care. Daughter is bedside and agrees w the plan. Full DC instructions discussed and patient knows when to seek immediate medical attention. Patient has proper follow-up. All results discussed with the patient they may require further work-up. Limitations of ED work-up discussed. All  questions and concerns from patient or family addressed. Understanding of insturctions verbalized

## 2021-10-17 NOTE — ED PROVIDER NOTE - CARE PROVIDER_API CALL
Sean Barnes)  Orthopaedic Surgery  [address] [place] [address]  Phone: [phone]  Fax: [phone]  Follow Up Time:

## 2021-10-17 NOTE — ED PROVIDER NOTE - ATTENDING CONTRIBUTION TO CARE
I personally evaluated the patient. I reviewed the Resident’s or Physician Assistant’s note (as assigned above), and agree with the findings and plan except as documented in my note.    89 yo female, PMHx of SAH due to fall, dementia, htn, copd, hypothyroidism, presents with sharp right shoulder pain after fall. Possible head trauma. No LOC. No neck pain. No CP, SOB. No back pain. No hip pain.     CONSTITUTIONAL: NAD  SKIN: skin exam is warm and dry, no acute rash.  HEAD: Normocephalic; small left frontal hematoma   EYES: PERRL, 3 mm bilateral, no nystagmus, EOM intact; conjunctiva and sclera clear.  ENT: No nasal discharge; airway clear.  NECK: Supple; non tender.+ full passive ROM in all directions. No JVD  CARD: S1, S2 normal; no murmurs, gallops, or rubs. Regular rate and rhythm. + Symmetric Strong Pulses  RESP: No wheezes, rales or rhonchi. Good air movement bilaterally  ABD: soft; non-distended; non-tender. No Rebound, No Gaurding, No signs of peritnitis, No CVA tenderness  EXT: Normal ROM. No clubbing, cyanosis or edema except for r humeral head area. Slight ttp over r humeral head    Plan- CT head/CT cerv spine, r shoulder xray, reassess

## 2021-10-17 NOTE — ED PROVIDER NOTE - NS ED ROS FT
GEN:  no fever, no chills, no generalized weakness  NEURO:  no headache, no dizziness  ENT: no sore throat, no runny nose  CV:  no chest pain, no palpitations  RESP:  no sob, no cough  GI:  no nausea, no vomiting, no abdominal pain  :  no dysuria  MSK:  +right shoulder pain and edema  SKIN:  +abrasions to right arm

## 2021-10-17 NOTE — ED ADULT NURSE NOTE - NSIMPLEMENTINTERV_GEN_ALL_ED
Implemented All Fall with Harm Risk Interventions:  Branch to call system. Call bell, personal items and telephone within reach. Instruct patient to call for assistance. Room bathroom lighting operational. Non-slip footwear when patient is off stretcher. Physically safe environment: no spills, clutter or unnecessary equipment. Stretcher in lowest position, wheels locked, appropriate side rails in place. Provide visual cue, wrist band, yellow gown, etc. Monitor gait and stability. Monitor for mental status changes and reorient to person, place, and time. Review medications for side effects contributing to fall risk. Reinforce activity limits and safety measures with patient and family. Provide visual clues: red socks.

## 2021-10-29 NOTE — PHYSICAL THERAPY INITIAL EVALUATION ADULT - PHYSICAL ASSIST/NONPHYSICAL ASSIST: CHAIR TO BED, REHAB EVAL
verbal cues/1 person assist no

## 2023-04-04 NOTE — ED ADULT NURSE NOTE - FINAL NURSING ELECTRONIC SIGNATURE
Infusion Nursing Note:  Mechelle Harper presents today for ketamine infusion.    Patient seen by provider today: No   present during visit today: Not Applicable.    Note: when questioned states she believes she continues to get benefit from ketamine infusions..    Intravenous Access:  Peripheral IV placed.  Placed by VAT.    Treatment Conditions:  Not Applicable.    Post Infusion Assessment:  Patient tolerated infusion without incident.  Patient observed for 60 minutes post ketamine infusion, per protocol.  Blood return noted pre and post infusion.  Site patent and intact, free from redness, edema or discomfort.  No evidence of extravasations.  Access discontinued per protocol.  PIV removed 40 minutes past end of ketamine as patient complained of itching and discomfort at IV site.     Discharge Plan:   Patient discharged in stable condition accompanied by: mother.  Departure Mode: Ambulatory.  RTC 4/18/23.      Ban Lomas                       17-Oct-2021 18:43

## 2023-07-19 NOTE — ED ADULT TRIAGE NOTE - IDEAL BODY WEIGHT(KG)
50 Carbohydrate Counting for People with Diabetes, Diabetes Label Reading Nutrition Tips, Plate Method for Diabetes

## 2023-12-13 NOTE — DIETITIAN INITIAL EVALUATION ADULT. - WEIGHT (LBS)
HPI   Chief Complaint   Patient presents with    Dizziness       Patient is a 83-year-old female with history of leukemia who presents to ED via ambulance for generalized weakness x 1 hour.  Patient states she was exiting [facility] this morning when she became very lightheaded and dizzy and felt like she was going to pass out at which point EMS was notified.  Per EMS there was some concern for stroke-like symptoms however stroke scale is 0 on exam in ED.  Patient complains of associated shortness of breath, dizziness, nausea, productive cough.  Patient denies any recent hospitalizations or surgeries.  Patient did have CBC and CMP done this morning prior to the start of her symptoms.  Per patient's daughter, she has been weak all morning with assistance with ambulation at times.                          Chris Coma Scale Score: 15         NIH Stroke Scale: 0          Patient History   Past Medical History:   Diagnosis Date    Essential (primary) hypertension 05/15/2018    Hypertension     Past Surgical History:   Procedure Laterality Date    BACK SURGERY  01/30/2014    Back Surgery    CHOLECYSTECTOMY  01/30/2014    Cholecystectomy    HYSTERECTOMY  01/30/2014    Hysterectomy    OTHER SURGICAL HISTORY  01/30/2014    Shoulder Surgery Left     Family History   Problem Relation Name Age of Onset    Emphysema Mother      Emphysema Father      Heart attack Sister      Lung cancer Brother      Lymphoma Son      Multiple sclerosis Son       Social History     Tobacco Use    Smoking status: Never    Smokeless tobacco: Never   Vaping Use    Vaping Use: Never used   Substance Use Topics    Alcohol use: Not Currently    Drug use: Never       Physical Exam   ED Triage Vitals [12/13/23 1353]   Temp Heart Rate Resp BP   36.8 °C (98.2 °F) 80 19 (!) 112/42      SpO2 Temp src Heart Rate Source Patient Position   98 % -- -- --      BP Location FiO2 (%)     -- --       Physical Exam  Constitutional:       Appearance: Normal appearance.    HENT:      Head: Normocephalic and atraumatic.   Eyes:      Extraocular Movements: Extraocular movements intact.   Cardiovascular:      Rate and Rhythm: Normal rate and regular rhythm.      Heart sounds: No murmur heard.  Pulmonary:      Effort: Pulmonary effort is normal.   Abdominal:      Palpations: Abdomen is soft.   Musculoskeletal:         General: Normal range of motion.      Cervical back: Neck supple.      Right lower leg: No edema.      Left lower leg: No edema.   Skin:     General: Skin is warm and dry.   Neurological:      General: No focal deficit present.      Mental Status: She is alert and oriented to person, place, and time.   Psychiatric:         Mood and Affect: Mood normal.         Behavior: Behavior normal.         ED Course & MDM   ED Course as of 12/13/23 1648   Wed Dec 13, 2023   1536 PROBNP(!): 834 [KEO]   1542 Troponin T, High Sensitivity(!): 26 [KEO]   1646 XR chest 2 views  Mild prominence of the pulmonary vasculature, as can be seen with  pulmonary vascular congestion. [KEO]      ED Course User Index  [KEO] Laron Hardwick PA-C       Medical Decision Making  Patient is an 83-year-old female with history of leukemia and chronic kidney disease who presents to ED via ambulance for generalized weakness x 1 hour.    Blood chemistry from this morning is unremarkable, shows stable chronic kidney disease GFR 30, creatinine 1.7    CBC from this morning shows WBC at 32.8 which is at her baseline according to previous results.  Hemoglobin 11.6 and stable.    I spoke with Dr. Dietrich. We thoroughly discussed the history, physical exam, laboratory and imaging studies, as well as, emergency department course. Based upon that discussion, we've decided to admit for further observation and evaluation of their dyspnea.  As I have deemed necessary from their history, physical, and studies, I have considered and evaluated for the following diagnoses: ACUTE CORONARY SYNDROME, CHRONIC OBSTRUCTIVE PULMONARY  DISEASE, CONGESTIVE HEART FAILURE, PERICARDIAL TAMPONADE, PNEUMONIA, PNEUMOTHORAX, PULMONARY EMBOLISM, SEPSIS, and THORACIC DISSECTION.    ** Disclaimer:  Parts of this document were written utilizing a voice to text dictation software.  Note may contain minor transcription or typographical errors that were inadvertently transcribed by the computer software.    Procedure  Procedures     Laron Hardwick PA-C  12/13/23 6357     89.9

## 2024-02-29 NOTE — ED PROVIDER NOTE - ADMIT DISPOSITION PRESENT ON ADMISSION SEPSIS
97
No
Nsaids Counseling: NSAID Counseling: I discussed with the patient that NSAIDs should be taken with food. Prolonged use of NSAIDs can result in the development of stomach ulcers.  Patient advised to stop taking NSAIDs if abdominal pain occurs.  The patient verbalized understanding of the proper use and possible adverse effects of NSAIDs.  All of the patient's questions and concerns were addressed.

## 2025-03-18 NOTE — ED PROCEDURE NOTE - ULTRASOUND FINDINGS
Spoke with Carissa. She has been having nausea, diarrhea for a few weeks. She is associating it with mounjaro. The symptoms start two days after taking mounjaro and then last for 24 hours or up to one week. Her last dose was on Friday. She spoke with Dr. Beach about this on 3/4 and per MR he suggested she hold moujaro but she did not. She will plan to hold now. We will follow up on Friday. Will plan for future appointments to review dexcom reports after she is off mounjaro. She may require adjustments to insulin in the future. Recommended she follow up with PCP if symptoms worsen.        Right Lung Slide/Left Lung Slide

## 2025-06-11 NOTE — ED ADULT NURSE NOTE - CAS TRG GEN SKIN COLOR
Normal for race [Fever] : no fever [Chills] : no chills [Night Sweats] : no night sweats [Discharge] : no discharge [Vision Problems] : no vision problems [Itching] : no itching [Earache] : no earache [Nasal Discharge] : no nasal discharge [Sore Throat] : no sore throat [Chest Pain] : no chest pain [Palpitations] : no palpitations [Lower Ext Edema] : no lower extremity edema [Shortness Of Breath] : no shortness of breath [Wheezing] : no wheezing [Cough] : no cough [Dyspnea on Exertion] : not dyspnea on exertion [Abdominal Pain] : no abdominal pain [Nausea] : no nausea [Constipation] : no constipation [Diarrhea] : no diarrhea [Vomiting] : no vomiting [Melena] : no melena [Dysuria] : no dysuria [Muscle Pain] : no muscle pain [Muscle Weakness] : no muscle weakness [Itching] : no itching [Skin Rash] : no skin rash [Headache] : no headache [Dizziness] : no dizziness [Suicidal] : not suicidal [Anxiety] : no anxiety [Depression] : no depression [Easy Bleeding] : no easy bleeding [Easy Bruising] : no easy bruising [Swollen Glands] : no swollen glands